# Patient Record
Sex: FEMALE | Race: WHITE | NOT HISPANIC OR LATINO
[De-identification: names, ages, dates, MRNs, and addresses within clinical notes are randomized per-mention and may not be internally consistent; named-entity substitution may affect disease eponyms.]

---

## 2017-03-27 ENCOUNTER — APPOINTMENT (OUTPATIENT)
Dept: SURGERY | Facility: CLINIC | Age: 77
End: 2017-03-27

## 2018-03-21 ENCOUNTER — APPOINTMENT (OUTPATIENT)
Dept: SURGERY | Facility: CLINIC | Age: 78
End: 2018-03-21

## 2018-04-06 ENCOUNTER — APPOINTMENT (OUTPATIENT)
Dept: SURGERY | Facility: CLINIC | Age: 78
End: 2018-04-06
Payer: MEDICARE

## 2018-04-06 VITALS
HEIGHT: 66 IN | OXYGEN SATURATION: 98 % | DIASTOLIC BLOOD PRESSURE: 70 MMHG | SYSTOLIC BLOOD PRESSURE: 120 MMHG | HEART RATE: 68 BPM | BODY MASS INDEX: 23.95 KG/M2 | WEIGHT: 149 LBS

## 2018-04-06 DIAGNOSIS — C73 MALIGNANT NEOPLASM OF THYROID GLAND: ICD-10-CM

## 2018-04-06 PROCEDURE — 99213 OFFICE O/P EST LOW 20 MIN: CPT

## 2018-04-06 RX ORDER — ONDANSETRON 4 MG/1
4 TABLET ORAL
Qty: 90 | Refills: 0 | Status: DISCONTINUED | COMMUNITY
Start: 2017-11-11

## 2018-04-06 RX ORDER — POTASSIUM CITRATE 10 MEQ/1
10 MEQ TABLET, EXTENDED RELEASE ORAL
Qty: 90 | Refills: 0 | Status: DISCONTINUED | COMMUNITY
Start: 2018-01-24

## 2018-04-06 RX ORDER — LATANOPROST/PF 0.005 %
0.01 DROPS OPHTHALMIC (EYE)
Qty: 2 | Refills: 0 | Status: DISCONTINUED | COMMUNITY
Start: 2017-11-23

## 2018-04-06 RX ORDER — PREDNISOLONE ORAL 15 MG/5ML
15 SOLUTION ORAL
Qty: 60 | Refills: 0 | Status: DISCONTINUED | COMMUNITY
Start: 2018-01-11

## 2018-04-06 RX ORDER — OMEPRAZOLE 40 MG/1
40 CAPSULE, DELAYED RELEASE ORAL
Qty: 180 | Refills: 0 | Status: DISCONTINUED | COMMUNITY
Start: 2018-01-05

## 2018-04-06 RX ORDER — FLUTICASONE PROPIONATE 50 UG/1
50 SPRAY, METERED NASAL
Qty: 16 | Refills: 0 | Status: DISCONTINUED | COMMUNITY
Start: 2018-01-22

## 2018-04-06 RX ORDER — BUTALBITAL, ACETAMINOPHEN AND CAFFEINE 325; 50; 40 MG/1; MG/1; MG/1
50-325-40 TABLET ORAL
Qty: 30 | Refills: 0 | Status: DISCONTINUED | COMMUNITY
Start: 2017-12-27

## 2018-04-06 RX ORDER — DICYCLOMINE HYDROCHLORIDE 10 MG/1
10 CAPSULE ORAL
Qty: 240 | Refills: 0 | Status: DISCONTINUED | COMMUNITY
Start: 2017-10-30

## 2018-04-06 RX ORDER — DORZOLAMIDE HYDROCHLORIDE TIMOLOL MALEATE 20; 5 MG/ML; MG/ML
22.3-6.8 SOLUTION/ DROPS OPHTHALMIC
Qty: 10 | Refills: 0 | Status: DISCONTINUED | COMMUNITY
Start: 2017-11-03

## 2018-04-06 RX ORDER — ESCITALOPRAM OXALATE 20 MG/1
20 TABLET ORAL
Qty: 30 | Refills: 0 | Status: DISCONTINUED | COMMUNITY
Start: 2017-11-06

## 2018-04-06 RX ORDER — DICYCLOMINE HYDROCHLORIDE 20 MG/1
20 TABLET ORAL
Qty: 120 | Refills: 0 | Status: DISCONTINUED | COMMUNITY
Start: 2017-11-02

## 2018-04-06 RX ORDER — ROSUVASTATIN CALCIUM 10 MG/1
10 TABLET, FILM COATED ORAL
Qty: 30 | Refills: 0 | Status: DISCONTINUED | COMMUNITY
Start: 2018-01-23

## 2018-04-06 RX ORDER — PANTOPRAZOLE 40 MG/1
40 TABLET, DELAYED RELEASE ORAL
Qty: 30 | Refills: 0 | Status: DISCONTINUED | COMMUNITY
Start: 2017-11-28

## 2018-04-06 RX ORDER — HYDROCORTISONE 25 MG/G
2.5 CREAM TOPICAL
Qty: 57 | Refills: 0 | Status: DISCONTINUED | COMMUNITY
Start: 2018-01-29

## 2018-04-06 RX ORDER — CIPROFLOXACIN HYDROCHLORIDE 500 MG/1
500 TABLET, FILM COATED ORAL
Qty: 14 | Refills: 0 | Status: DISCONTINUED | COMMUNITY
Start: 2017-11-07

## 2020-05-19 ENCOUNTER — INPATIENT (INPATIENT)
Facility: HOSPITAL | Age: 80
LOS: 10 days | Discharge: HOME HEALTH | DRG: 949 | End: 2020-05-30
Attending: PHYSICAL MEDICINE & REHABILITATION | Admitting: PHYSICAL MEDICINE & REHABILITATION
Payer: MEDICARE

## 2020-05-19 VITALS
RESPIRATION RATE: 16 BRPM | SYSTOLIC BLOOD PRESSURE: 151 MMHG | OXYGEN SATURATION: 93 % | TEMPERATURE: 99 F | DIASTOLIC BLOOD PRESSURE: 76 MMHG | HEART RATE: 90 BPM

## 2020-05-19 DIAGNOSIS — Z90.710 ACQUIRED ABSENCE OF BOTH CERVIX AND UTERUS: Chronic | ICD-10-CM

## 2020-05-19 DIAGNOSIS — Z98.890 OTHER SPECIFIED POSTPROCEDURAL STATES: Chronic | ICD-10-CM

## 2020-05-19 DIAGNOSIS — S12.000A UNSPECIFIED DISPLACED FRACTURE OF FIRST CERVICAL VERTEBRA, INITIAL ENCOUNTER FOR CLOSED FRACTURE: ICD-10-CM

## 2020-05-19 DIAGNOSIS — Z95.5 PRESENCE OF CORONARY ANGIOPLASTY IMPLANT AND GRAFT: Chronic | ICD-10-CM

## 2020-05-19 DIAGNOSIS — Z90.49 ACQUIRED ABSENCE OF OTHER SPECIFIED PARTS OF DIGESTIVE TRACT: Chronic | ICD-10-CM

## 2020-05-19 RX ORDER — ENOXAPARIN SODIUM 100 MG/ML
40 INJECTION SUBCUTANEOUS DAILY
Refills: 0 | Status: DISCONTINUED | OUTPATIENT
Start: 2020-05-20 | End: 2020-05-30

## 2020-05-19 RX ORDER — ESCITALOPRAM OXALATE 10 MG/1
20 TABLET, FILM COATED ORAL DAILY
Refills: 0 | Status: DISCONTINUED | OUTPATIENT
Start: 2020-05-19 | End: 2020-05-30

## 2020-05-19 RX ORDER — LATANOPROST 0.05 MG/ML
1 SOLUTION/ DROPS OPHTHALMIC; TOPICAL AT BEDTIME
Refills: 0 | Status: DISCONTINUED | OUTPATIENT
Start: 2020-05-19 | End: 2020-05-30

## 2020-05-19 RX ORDER — ACETAMINOPHEN 500 MG
650 TABLET ORAL EVERY 6 HOURS
Refills: 0 | Status: DISCONTINUED | OUTPATIENT
Start: 2020-05-19 | End: 2020-05-30

## 2020-05-19 RX ORDER — TIMOLOL 0.5 %
1 DROPS OPHTHALMIC (EYE)
Refills: 0 | Status: DISCONTINUED | OUTPATIENT
Start: 2020-05-19 | End: 2020-05-30

## 2020-05-19 RX ORDER — ATORVASTATIN CALCIUM 80 MG/1
40 TABLET, FILM COATED ORAL AT BEDTIME
Refills: 0 | Status: DISCONTINUED | OUTPATIENT
Start: 2020-05-19 | End: 2020-05-30

## 2020-05-19 RX ORDER — GABAPENTIN 400 MG/1
200 CAPSULE ORAL THREE TIMES A DAY
Refills: 0 | Status: DISCONTINUED | OUTPATIENT
Start: 2020-05-19 | End: 2020-05-25

## 2020-05-19 RX ORDER — ASPIRIN/CALCIUM CARB/MAGNESIUM 324 MG
81 TABLET ORAL DAILY
Refills: 0 | Status: DISCONTINUED | OUTPATIENT
Start: 2020-05-19 | End: 2020-05-30

## 2020-05-19 RX ORDER — ONDANSETRON 8 MG/1
4 TABLET, FILM COATED ORAL EVERY 6 HOURS
Refills: 0 | Status: DISCONTINUED | OUTPATIENT
Start: 2020-05-19 | End: 2020-05-30

## 2020-05-19 RX ORDER — LEVOTHYROXINE SODIUM 125 MCG
100 TABLET ORAL DAILY
Refills: 0 | Status: DISCONTINUED | OUTPATIENT
Start: 2020-05-19 | End: 2020-05-30

## 2020-05-19 RX ORDER — PANTOPRAZOLE SODIUM 20 MG/1
40 TABLET, DELAYED RELEASE ORAL
Refills: 0 | Status: DISCONTINUED | OUTPATIENT
Start: 2020-05-19 | End: 2020-05-30

## 2020-05-19 RX ORDER — POTASSIUM CHLORIDE 20 MEQ
10 PACKET (EA) ORAL
Refills: 0 | Status: DISCONTINUED | OUTPATIENT
Start: 2020-05-19 | End: 2020-05-30

## 2020-05-19 RX ORDER — CHOLECALCIFEROL (VITAMIN D3) 125 MCG
1000 CAPSULE ORAL DAILY
Refills: 0 | Status: DISCONTINUED | OUTPATIENT
Start: 2020-05-19 | End: 2020-05-30

## 2020-05-19 RX ORDER — ASCORBIC ACID 60 MG
500 TABLET,CHEWABLE ORAL
Refills: 0 | Status: DISCONTINUED | OUTPATIENT
Start: 2020-05-19 | End: 2020-05-30

## 2020-05-19 RX ORDER — OXYCODONE HYDROCHLORIDE 5 MG/1
5 TABLET ORAL EVERY 4 HOURS
Refills: 0 | Status: DISCONTINUED | OUTPATIENT
Start: 2020-05-19 | End: 2020-05-20

## 2020-05-19 RX ADMIN — OXYCODONE HYDROCHLORIDE 5 MILLIGRAM(S): 5 TABLET ORAL at 17:44

## 2020-05-19 RX ADMIN — Medication 10 MILLIEQUIVALENT(S): at 17:30

## 2020-05-19 RX ADMIN — GABAPENTIN 200 MILLIGRAM(S): 400 CAPSULE ORAL at 21:43

## 2020-05-19 RX ADMIN — ONDANSETRON 4 MILLIGRAM(S): 8 TABLET, FILM COATED ORAL at 17:44

## 2020-05-19 RX ADMIN — ATORVASTATIN CALCIUM 40 MILLIGRAM(S): 80 TABLET, FILM COATED ORAL at 21:43

## 2020-05-19 RX ADMIN — Medication 500 MILLIGRAM(S): at 17:31

## 2020-05-19 RX ADMIN — Medication 20 MILLIGRAM(S): at 17:30

## 2020-05-19 RX ADMIN — LATANOPROST 1 DROP(S): 0.05 SOLUTION/ DROPS OPHTHALMIC; TOPICAL at 21:43

## 2020-05-19 RX ADMIN — Medication 1 DROP(S): at 17:30

## 2020-05-19 NOTE — H&P ADULT - NSHPPHYSICALEXAM_GEN_ALL_CORE
On Physical Examination:  Mental Status - Patient is alert, awake, oriented X3. Speech is fluent, able to  name and repeat. Normal attention and concentration.  Follows commands well and able to answer questions appropriately. Mood and affect  normal.  Cranial Nerves - VFF, PERRL, EOMI, V1-V3 intact, no gross facial asymmetry, no dysarthria, no dysphagia,  Motor Exam -   Right upper  Left upper  Right lower  Left lower   Normal muscle bulk/tone  Sensory    Intact to light touch and pinprick bilaterally. Normal JPS, vibration.Normal cortical sensation  Coord: FTN intact bilaterally   No tremors  Gait -  normal , no ataxia    DTS Reflexes:           Toes are flexor                           GENERAL Exam:     Nontoxic , No Acute Distress 	  HEENT:  normocephalic, atraumatic	  LUNGS:	Clear bilaterally  No Wheeze  Decreased bilaterally	  HEART:	 Normal S1S2   No murmur RRR        GI/ ABDOMEN:  Soft  Non tender  EXTREMITIES:   No Edema  No Clubbing  No Cyanosis No Edema  MUSCULOSKELETAL: Normal Range of Motion	   SKIN:      Normal   No Ecchymosis On Physical Examination:    Vital Signs Last 24 Hrs  T(C): 37 (19 May 2020 15:05), Max: 37 (19 May 2020 15:05)  T(F): 98.6 (19 May 2020 15:05), Max: 98.6 (19 May 2020 15:05)  HR: 90 (19 May 2020 15:05) (90 - 90)  BP: 151/76 (19 May 2020 15:05) (151/76 - 151/76)  RR: 16 (19 May 2020 15:05) (16 - 16)  SpO2: 93% (19 May 2020 15:05) (93% - 93%)    Mental Status - Patient is alert, awake, oriented X3. Speech is fluent, able to  name and repeat. Normal attention and concentration.  Follows commands well and able to answer questions appropriately. Mood and affect  normal.  Cranial Nerves - no gross facial asymmetry, no dysarthria  Motor Exam -   Right upper Full AROM 5/5 throughout   Left upper In sling,   Right lower full AROM, 5/5 throughout   Left lower  full AROM, 5/5 throughout   Normal muscle bulk/tone  Sensory    Intact to light touch bilaterally                  GENERAL Exam:     Nontoxic , No Acute Distress 	  HEENT:  normocephalic, atraumatic	  LUNGS:	Clear bilaterally    HEART:	 Normal S1S2   No murmur RRR        GI/ ABDOMEN:  Soft,  +BS, tender to palpation  EXTREMITIES:   +Edema  left upper extremity   MUSCULOSKELETAL: as above    SKIN:      + Ecchymosis right elbow, partial skin loss with flap intact to right elbow. traumatic partial thickness skin loss over left shin,   No signs of skin breakdown or pressure wounds.

## 2020-05-19 NOTE — H&P ADULT - NSICDXPASTSURGICALHX_GEN_ALL_CORE_FT
PAST SURGICAL HISTORY:  H/O lithotripsy     History of local excision of skin lesion     S/P cholecystectomy     S/P hysterectomy     S/P thyroidectomy     Stented coronary artery

## 2020-05-19 NOTE — H&P ADULT - ASSESSMENT
This is a 81 y/o female with PMHx of CAD s/p stenting, HLD, thyroid CA s/p thyroidectomy, migraines who had a fall down a flight of stairs resulting in a C2 anterior wedge fracture, T9 vertebral fx (age indeterminate), and left humeral fx who now has functional deficits secondary to these injuries consisting of gait and ADL impairments as well as dysphagia.       Functional deficits secondary to trauma/C2 wedge fx, left humeral fx, dysphagia   -Start comprehensive acute IPR program consisting of PT, OT and ST  -    Precautions:  falls, spine                                                                               Diet: soft with     DVT Prophylaxis:                                                                          Medical Prognosis:     Prescreen Comparison: I have reviewed the prescreen information and I found no relevant changes between the preadmission  screening and my post admission evaluation.     Expected Therapy:   P.T.        hrs/day           O. T.      hrs/day           S.L.P.        hrs/day                    P&O                                                   Excpected Frequency: 5 days/7 day period    Rehab Potential:                                           Estimated Disposition:                          ELOS:              days      Rationale For Inpatient Rehab Admission- Patient demonstrates the following:     [X] Medically appropriate for rehabilitation admission   [X] Has attainable rehab goals with an approrpriate discharge plan  [X] Has rehabilitation potential (expected to make significant improvement within a reasonable period of time)  [X] Requires close medical management by a rehab physician, rehab nursing care and comprehensive interdisciplinary team (including         PT, OT, SLP and/or prosthetics and orthotics) This is a 79 y/o female with PMHx of CAD s/p stenting, HLD, thyroid CA s/p thyroidectomy, migraines who had a fall down a flight of stairs resulting in a C2 anterior wedge fracture, T9 vertebral fx (age indeterminate), and left humeral fx who now has functional deficits secondary to these injuries consisting of gait and ADL impairments as well as dysphagia.       Functional deficits secondary to trauma/C2 wedge fx, left humeral fx, dysphagia   -Start comprehensive acute IPR program consisting of PT, OT and ST  -    Precautions:  falls, spine                                                                               Diet: soft with nectar thick liquids     DVT Prophylaxis:   lovenox, IPCs                                                               Medical Prognosis: good    Prescreen Comparison: I have reviewed the prescreen information and I found no relevant changes between the preadmission  screening and my post admission evaluation.     Expected Therapy:   P.T.    2    hrs/day           O. T.     1 hrs/day           S.L.P.  eval     hrs/day           P&O                                                   Expected Frequency: 5 days/7 day period    Rehab Potential:    good          Estimated Disposition:    home with home care         ELOS:        10-14      days      Rationale For Inpatient Rehab Admission- Patient demonstrates the following:     [X] Medically appropriate for rehabilitation admission   [X] Has attainable rehab goals with an appropriate discharge plan  [X] Has rehabilitation potential (expected to make significant improvement within a reasonable period of time)  [X] Requires close medical management by a rehab physician, rehab nursing care and comprehensive interdisciplinary team (including         PT, OT, SLP and/or prosthetics and orthotics) This is a 81 y/o female with PMHx of CAD s/p stenting, HLD, thyroid CA s/p thyroidectomy, migraines who had a fall down a flight of stairs resulting in a C2 anterior wedge fracture, T9 vertebral fx (age indeterminate), and left humeral fx who now has functional deficits secondary to these injuries consisting of gait and ADL impairments as well as dysphagia.       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     Expected Therapy:   P.T.    2    hrs/day           O. T.     1 hrs/day           S.L.P.  eval     hrs/day           P&O                                                   Expected Frequency: 5 days/7 day period    Rehab Potential:    good          Estimated Disposition:    home with home care         ELOS:        10-14      days      Rationale For Inpatient Rehab Admission- Patient demonstrates the following:     [X] Medically appropriate for rehabilitation admission   [X] Has attainable rehab goals with an appropriate discharge plan  [X] Has rehabilitation potential (expected to make significant improvement within a reasonable period of time)  [X] Requires close medical management by a rehab physician, rehab nursing care and comprehensive interdisciplinary team (including         PT, OT, SLP and/or prosthetics and orthotics) This is a 79 y/o female with PMHx of CAD s/p stenting, HLD, thyroid CA s/p thyroidectomy, migraines who had a fall down a flight of stairs resulting in a C2 anterior wedge fracture, T9 vertebral fx (age indeterminate), and left humeral fx who now has functional deficits secondary to these injuries consisting of gait and ADL impairments as well as dysphagia.       Functional deficits secondary to trauma/C2 wedge fx, left humeral fx, dysphagia   -Start comprehensive acute IPR program consisting of PT, OT and ST  -Pain management with prn tylenol and prn oxycodone   -Sling to left arm ATC   -[place] J collar when OOB    Dysphagia  -Patient on [facility] soft with nectar thick liquids at outside hospital  -SLP for swallow eval     Anemia (likely secondary to post op blood loss)  -Monitor H/H     Thrombocytopenia  -Plts in 70s on admission to outside hospital, had been trending up   -Check labs in morning     Migraines   -C/w Fioricet qAM    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     Expected Therapy:   P.T.    2    hrs/day           O. T.     1 hrs/day           S.L.P.  eval     hrs/day           P&O                                                   Expected Frequency: 5 days/7 day period    Rehab Potential:    good          Estimated Disposition:    home with home care         ELOS:        10-14      days      Rationale For Inpatient Rehab Admission- Patient demonstrates the following:     [X] Medically appropriate for rehabilitation admission   [X] Has attainable rehab goals with an appropriate discharge plan  [X] Has rehabilitation potential (expected to make significant improvement within a reasonable period of time)  [X] Requires close medical management by a rehab physician, rehab nursing care and comprehensive interdisciplinary team (including         PT, OT, SLP and/or prosthetics and orthotics)

## 2020-05-19 NOTE — H&P ADULT - NSHPLABSRESULTS_GEN_ALL_CORE
5/18/2020:  Na 137  K 4.2  BUN 16  Cr 0.4  WBC 6.42  H/H 10.1/31.3  Plts 112    SARS-CoV-2 PCR on 5/14/2020 Negative

## 2020-05-19 NOTE — H&P ADULT - NSICDXPASTMEDICALHX_GEN_ALL_CORE_FT
PAST MEDICAL HISTORY:  CAD (coronary artery disease)     HLD (hyperlipidemia)     Kidney stone     Skin cancer     Thyroid cancer PAST MEDICAL HISTORY:  CAD (coronary artery disease)     H/O migraine     HLD (hyperlipidemia)     IBS (irritable bowel syndrome)     Kidney stone     Skin cancer     Thyroid cancer

## 2020-05-19 NOTE — H&P ADULT - NSHPSOCIALHISTORY_GEN_ALL_CORE
Lives with her significant other in a PH, stairs to enter.   Independent with ADLs PTA.   Used quad cane at home for community ambulation.  Patient was working at [employer] prior to accident.   Significant out of work at this time and was helping with house hold duties and     Current functional status:   sit to supine independent   Side lying to sit Mod A   Sit to side laying Min A   sit to stand min A with RW  ambulating 10ft with hemicane and min A with verbal cuing   stand to sit Min A Lives with her significant other in a PH, 4 cement stairs outside. 17 steps to bedroom.    Independent with ADLs PTA.   Has quad cane at home.  Patient was working at [employer] prior to accident.   Significant out of work at this time and was helping with house hold duties and grocery shopping.     Current functional status:   sit to supine independent   Side lying to sit Mod A   Sit to side laying Min A   sit to stand min A with RW  ambulating 10ft with hemicane and min A with verbal cuing   stand to sit Min A

## 2020-05-19 NOTE — H&P ADULT - NSHPREVIEWOFSYSTEMS_GEN_ALL_CORE
Patient denies SOB or cough. + bilat rib pain (posteriorly and anteriorly) with movement or palpation over ribs.  + Left shoulder pain.   H/o migraines, no HAs currently.   + H/o IBS, BM this morning.   Chronic stress incontinence, uses sanitary pads. Denies dysuria.   + chronic pain to abdomen secondary to hysterectomy, patient reports pain is neuropathic in nature and has been on gabapentin for years.   No sensation deficits. Denies tingling or numbness in extremities.  Reports that he she was drinking thin liquids prior to d/c to [facility] but was not upgraded by a ST.

## 2020-05-19 NOTE — H&P ADULT - HISTORY OF PRESENT ILLNESS
This is a 79 y/o female who presented to the ED at [facility] after falling down a flight of stairs. Patient reports that she was getting up to use the restroom and while reaching for a light at the top of the stairs, lost her balance and fell down the stairs. In the ED, she had complaints of right chest wall pain, right elbow pain, and left shoulder pain. CTH was negative, CT of the spine showed corner fx of the anterior aspect of the base of C2. CT chest showed many chronic rib fractures and a T 9 compression fx (indeterminate age). CT of the abdomen showed subcutaneous edema/hemorrhage of the right flank. Xray of the left shoulder showed a comminuted fx of the proximal humerus. Othropedics were consulted and determined no surgical intervention was needed for the left humerus fx was needed. Recommended that patient is to remain NWB to left arm at all times. Neurosurgery was also consulted and determined that no surgical intervention was needed for C2 fx. Recommended that a [place] J collar be worn when OOB only. This is a 81 y/o female who presented to the ED at [facility] after falling down a flight of stairs. Patient reports that she was getting up to use the restroom and while reaching for a light at the top of the stairs, lost her balance and fell down the stairs. In the ED, she had complaints of right chest wall pain, right elbow pain, and left shoulder pain. CTH was negative, CT of the spine showed corner fx of the anterior aspect of the base of C2. CT chest showed many chronic rib fractures and a T 9 compression fx (indeterminate age). CT of the abdomen showed subcutaneous edema/hemorrhage of the right flank. Xray of the left shoulder showed a comminuted fx of the proximal humerus. Othropedics were consulted and determined no surgical intervention was needed for the left humerus fx was needed. Recommended that patient is to remain NWB to left arm at all times. Neurosurgery was also consulted and determined that no surgical intervention was needed for C2 fx. Recommended that a [place] J collar be worn when OOB only.   Swallow eval done on May 15. Found patient to have moderate dysphagia, recommended nectar thick liquids with mechanical soft diet. Recommend small sips/bites. This is a 81 y/o female who presented to the ED at [facility] after falling down a flight of stairs. Patient reports that she was getting up to use the restroom and while reaching for a light at the top of the stairs, lost her balance and fell down the stairs. In the ED, she had complaints of right chest wall pain, right elbow pain, and left shoulder pain. CTH was negative, CT of the spine showed corner fx of the anterior aspect of the base of C2. CT chest showed many chronic rib fractures and a T 9 compression fx (indeterminate age). CT of the abdomen showed subcutaneous edema/hemorrhage of the right flank. Xray of the left shoulder showed a comminuted fx of the proximal humerus. Othropedics were consulted and determined no surgical intervention was needed for the left humerus fx was needed. Recommended that patient is to remain NWB to left arm at all times. Neurosurgery was also consulted and determined that no surgical intervention was needed for C2 fx. Recommended that a [place] J collar be worn when OOB only.   Swallow eval done on May 15. Found patient to have moderate dysphagia, recommended nectar thick liquids with mechanical soft diet. Recommend small sips/bites.     Patient deemed medically stable for transfer to acute IPR at [facility] on 5/19/2020.

## 2020-05-20 LAB
ALBUMIN SERPL ELPH-MCNC: 2.8 G/DL — LOW (ref 3.3–5)
ALP SERPL-CCNC: 132 U/L — HIGH (ref 40–120)
ALT FLD-CCNC: 26 U/L — SIGNIFICANT CHANGE UP (ref 10–45)
ANION GAP SERPL CALC-SCNC: 9 MMOL/L — SIGNIFICANT CHANGE UP (ref 5–17)
AST SERPL-CCNC: 27 U/L — SIGNIFICANT CHANGE UP (ref 10–40)
BASOPHILS # BLD AUTO: 0.03 K/UL — SIGNIFICANT CHANGE UP (ref 0–0.2)
BASOPHILS NFR BLD AUTO: 0.4 % — SIGNIFICANT CHANGE UP (ref 0–2)
BILIRUB SERPL-MCNC: 1 MG/DL — SIGNIFICANT CHANGE UP (ref 0.2–1.2)
BUN SERPL-MCNC: 13 MG/DL — SIGNIFICANT CHANGE UP (ref 7–23)
CALCIUM SERPL-MCNC: 8.6 MG/DL — SIGNIFICANT CHANGE UP (ref 8.4–10.5)
CHLORIDE SERPL-SCNC: 101 MMOL/L — SIGNIFICANT CHANGE UP (ref 96–108)
CO2 SERPL-SCNC: 30 MMOL/L — SIGNIFICANT CHANGE UP (ref 22–31)
CREAT SERPL-MCNC: 0.44 MG/DL — LOW (ref 0.5–1.3)
EOSINOPHIL # BLD AUTO: 0.16 K/UL — SIGNIFICANT CHANGE UP (ref 0–0.5)
EOSINOPHIL NFR BLD AUTO: 2.3 % — SIGNIFICANT CHANGE UP (ref 0–6)
GLUCOSE SERPL-MCNC: 109 MG/DL — HIGH (ref 70–99)
HCT VFR BLD CALC: 31.8 % — LOW (ref 34.5–45)
HGB BLD-MCNC: 10.3 G/DL — LOW (ref 11.5–15.5)
IMM GRANULOCYTES NFR BLD AUTO: 1.4 % — SIGNIFICANT CHANGE UP (ref 0–1.5)
LYMPHOCYTES # BLD AUTO: 1.65 K/UL — SIGNIFICANT CHANGE UP (ref 1–3.3)
LYMPHOCYTES # BLD AUTO: 23.2 % — SIGNIFICANT CHANGE UP (ref 13–44)
MAGNESIUM SERPL-MCNC: 1.8 MG/DL — SIGNIFICANT CHANGE UP (ref 1.6–2.6)
MCHC RBC-ENTMCNC: 29.9 PG — SIGNIFICANT CHANGE UP (ref 27–34)
MCHC RBC-ENTMCNC: 32.4 GM/DL — SIGNIFICANT CHANGE UP (ref 32–36)
MCV RBC AUTO: 92.4 FL — SIGNIFICANT CHANGE UP (ref 80–100)
MONOCYTES # BLD AUTO: 0.67 K/UL — SIGNIFICANT CHANGE UP (ref 0–0.9)
MONOCYTES NFR BLD AUTO: 9.4 % — SIGNIFICANT CHANGE UP (ref 2–14)
NEUTROPHILS # BLD AUTO: 4.5 K/UL — SIGNIFICANT CHANGE UP (ref 1.8–7.4)
NEUTROPHILS NFR BLD AUTO: 63.3 % — SIGNIFICANT CHANGE UP (ref 43–77)
NRBC # BLD: 0 /100 WBCS — SIGNIFICANT CHANGE UP (ref 0–0)
PHOSPHATE SERPL-MCNC: 3.4 MG/DL — SIGNIFICANT CHANGE UP (ref 2.5–4.5)
PLATELET # BLD AUTO: 137 K/UL — LOW (ref 150–400)
POTASSIUM SERPL-MCNC: 3.8 MMOL/L — SIGNIFICANT CHANGE UP (ref 3.5–5.3)
POTASSIUM SERPL-SCNC: 3.8 MMOL/L — SIGNIFICANT CHANGE UP (ref 3.5–5.3)
PROT SERPL-MCNC: 6.2 G/DL — SIGNIFICANT CHANGE UP (ref 6–8.3)
RBC # BLD: 3.44 M/UL — LOW (ref 3.8–5.2)
RBC # FLD: 13.3 % — SIGNIFICANT CHANGE UP (ref 10.3–14.5)
SARS-COV-2 RNA SPEC QL NAA+PROBE: SIGNIFICANT CHANGE UP
SODIUM SERPL-SCNC: 140 MMOL/L — SIGNIFICANT CHANGE UP (ref 135–145)
VIT D25+D1,25 OH+D1,25 PNL SERPL-MCNC: 94 PG/ML — HIGH (ref 19.9–79.3)
WBC # BLD: 7.11 K/UL — SIGNIFICANT CHANGE UP (ref 3.8–10.5)
WBC # FLD AUTO: 7.11 K/UL — SIGNIFICANT CHANGE UP (ref 3.8–10.5)

## 2020-05-20 PROCEDURE — 71045 X-RAY EXAM CHEST 1 VIEW: CPT | Mod: 26

## 2020-05-20 PROCEDURE — 99223 1ST HOSP IP/OBS HIGH 75: CPT

## 2020-05-20 RX ORDER — LOPERAMIDE HCL 2 MG
2 TABLET ORAL EVERY 6 HOURS
Refills: 0 | Status: DISCONTINUED | OUTPATIENT
Start: 2020-05-20 | End: 2020-05-30

## 2020-05-20 RX ORDER — LANOLIN ALCOHOL/MO/W.PET/CERES
6 CREAM (GRAM) TOPICAL AT BEDTIME
Refills: 0 | Status: DISCONTINUED | OUTPATIENT
Start: 2020-05-20 | End: 2020-05-23

## 2020-05-20 RX ORDER — TRAMADOL HYDROCHLORIDE 50 MG/1
50 TABLET ORAL EVERY 4 HOURS
Refills: 0 | Status: DISCONTINUED | OUTPATIENT
Start: 2020-05-20 | End: 2020-05-26

## 2020-05-20 RX ADMIN — Medication 10 MILLIEQUIVALENT(S): at 17:31

## 2020-05-20 RX ADMIN — TRAMADOL HYDROCHLORIDE 50 MILLIGRAM(S): 50 TABLET ORAL at 20:46

## 2020-05-20 RX ADMIN — PANTOPRAZOLE SODIUM 40 MILLIGRAM(S): 20 TABLET, DELAYED RELEASE ORAL at 05:08

## 2020-05-20 RX ADMIN — Medication 1 DROP(S): at 05:06

## 2020-05-20 RX ADMIN — Medication 20 MILLIGRAM(S): at 11:21

## 2020-05-20 RX ADMIN — GABAPENTIN 200 MILLIGRAM(S): 400 CAPSULE ORAL at 14:15

## 2020-05-20 RX ADMIN — Medication 500 MILLIGRAM(S): at 17:31

## 2020-05-20 RX ADMIN — Medication 81 MILLIGRAM(S): at 11:21

## 2020-05-20 RX ADMIN — GABAPENTIN 200 MILLIGRAM(S): 400 CAPSULE ORAL at 05:06

## 2020-05-20 RX ADMIN — ONDANSETRON 4 MILLIGRAM(S): 8 TABLET, FILM COATED ORAL at 11:21

## 2020-05-20 RX ADMIN — Medication 20 MILLIGRAM(S): at 17:31

## 2020-05-20 RX ADMIN — Medication 10 MILLIEQUIVALENT(S): at 05:06

## 2020-05-20 RX ADMIN — Medication 2 MILLIGRAM(S): at 17:33

## 2020-05-20 RX ADMIN — ESCITALOPRAM OXALATE 20 MILLIGRAM(S): 10 TABLET, FILM COATED ORAL at 11:21

## 2020-05-20 RX ADMIN — Medication 1 TABLET(S): at 11:21

## 2020-05-20 RX ADMIN — ENOXAPARIN SODIUM 40 MILLIGRAM(S): 100 INJECTION SUBCUTANEOUS at 11:23

## 2020-05-20 RX ADMIN — Medication 20 MILLIGRAM(S): at 05:08

## 2020-05-20 RX ADMIN — ATORVASTATIN CALCIUM 40 MILLIGRAM(S): 80 TABLET, FILM COATED ORAL at 20:46

## 2020-05-20 RX ADMIN — Medication 6 MILLIGRAM(S): at 20:46

## 2020-05-20 RX ADMIN — ONDANSETRON 4 MILLIGRAM(S): 8 TABLET, FILM COATED ORAL at 01:03

## 2020-05-20 RX ADMIN — OXYCODONE HYDROCHLORIDE 5 MILLIGRAM(S): 5 TABLET ORAL at 11:21

## 2020-05-20 RX ADMIN — Medication 1 DROP(S): at 17:31

## 2020-05-20 RX ADMIN — Medication 2 MILLIGRAM(S): at 10:33

## 2020-05-20 RX ADMIN — LATANOPROST 1 DROP(S): 0.05 SOLUTION/ DROPS OPHTHALMIC; TOPICAL at 20:46

## 2020-05-20 RX ADMIN — Medication 100 MICROGRAM(S): at 05:05

## 2020-05-20 RX ADMIN — Medication 1000 UNIT(S): at 11:21

## 2020-05-20 RX ADMIN — Medication 1 TABLET(S): at 08:19

## 2020-05-20 RX ADMIN — GABAPENTIN 200 MILLIGRAM(S): 400 CAPSULE ORAL at 20:46

## 2020-05-20 RX ADMIN — Medication 500 MILLIGRAM(S): at 05:05

## 2020-05-20 RX ADMIN — ONDANSETRON 4 MILLIGRAM(S): 8 TABLET, FILM COATED ORAL at 17:31

## 2020-05-20 NOTE — CONSULT NOTE ADULT - SUBJECTIVE AND OBJECTIVE BOX
HPI:  81yo female with hx of Hypothyroidism, glaucoma, HLD, presented to [facility] for acute rehab from [facility] after falling down a flight of stairs. Patient reports that she was getting up to use the restroom and while reaching for a light at the top of the stairs, lost her balance and fell down the stairs. CTH was negative, CT of the spine showed corner fx of the anterior aspect of the base of C2. CT chest showed many chronic rib fractures and a T 9 compression fx (indeterminate age). CT of the abdomen showed subcutaneous edema/hemorrhage of the right flank. Xray of the left shoulder showed a comminuted fx of the proximal humerus. Orthopedic surgery recommended no surgical intervention for the left humerus fx. Recommended NWB to left arm at all times. Neurosurgery recommended no surgical intervention for C2 fx. Recommended that a [place] J collar be worn when OOB only.   Swallow eval done on May 15. Found patient to have moderate dysphagia, recommended nectar thick liquids with mechanical soft diet. Recommend small sips/bites.   Pt was considered stable for discharge to acute rehab      Home Medications:  Synthroid 100mcg daily  Atorvastatin 40mg daily  Timolol 0.25% soln both eyes BID  Latanoprost 0.005% soln both eyes QHS     PAST MEDICAL & SURGICAL HISTORY:  IBS (irritable bowel syndrome)  H/O migraine  Skin cancer  Kidney stone  Thyroid cancer  HLD (hyperlipidemia)  CAD (coronary artery disease)  S/P cholecystectomy  S/P hysterectomy  History of local excision of skin lesion  H/O lithotripsy  S/P thyroidectomy  Stented coronary artery      Review of Systems:   CONSTITUTIONAL: No fever, weight loss, or fatigue  EYES: No eye pain, visual disturbances, or discharge  ENMT:  No difficulty hearing, tinnitus, vertigo; No sinus or throat pain  NECK: No pain or stiffness  BREASTS: No pain, masses, or nipple discharge  RESPIRATORY: No cough, wheezing, chills or hemoptysis; No shortness of breath  CARDIOVASCULAR: No chest pain, palpitations, dizziness, or leg swelling  GASTROINTESTINAL: No abdominal or epigastric pain. No nausea, vomiting, or hematemesis; No diarrhea or constipation. No melena or hematochezia.  GENITOURINARY: No dysuria, frequency, hematuria, or incontinence  NEUROLOGICAL: No headaches, memory loss, loss of strength, numbness, or tremors  SKIN: No itching, burning, rashes, or lesions   LYMPH NODES: No enlarged glands  ENDOCRINE: No heat or cold intolerance; No hair loss  MUSCULOSKELETAL: No joint pain or swelling; No muscle, back, or extremity pain  PSYCHIATRIC: No depression, anxiety, mood swings, or difficulty sleeping  HEME/LYMPH: No easy bruising, or bleeding gums  ALLERY AND IMMUNOLOGIC: No hives or eczema    Allergies  Valium (Unknown)  Intolerances    Social History:   Lives at home with significant other  Denies smoking, EtOH use    FAMILY HISTORY:  Noncontributory    MEDICATIONS  (STANDING):  acetaminophen 325 mG/butalbital 50 mG/caffeine 40 mG 1 Tablet(s) Oral <User Schedule>  ascorbic acid 500 milliGRAM(s) Oral two times a day  aspirin enteric coated 81 milliGRAM(s) Oral daily  atorvastatin 40 milliGRAM(s) Oral at bedtime  cholecalciferol 1000 Unit(s) Oral daily  dicyclomine 20 milliGRAM(s) Oral three times a day before meals  enoxaparin Injectable 40 milliGRAM(s) SubCutaneous daily  escitalopram 20 milliGRAM(s) Oral daily  gabapentin 200 milliGRAM(s) Oral three times a day  latanoprost 0.005% Ophthalmic Solution 1 Drop(s) Both EYES at bedtime  levothyroxine 100 MICROGram(s) Oral daily  multivitamin 1 Tablet(s) Oral daily  pantoprazole    Tablet 40 milliGRAM(s) Oral before breakfast  potassium chloride    Tablet ER 10 milliEquivalent(s) Oral two times a day  rifAXIMin 550 milliGRAM(s) Oral <User Schedule>  timolol 0.25% Solution 1 Drop(s) Both EYES two times a day    MEDICATIONS  (PRN):  acetaminophen   Tablet .. 650 milliGRAM(s) Oral every 6 hours PRN Mild Pain (1 - 3)  loperamide 2 milliGRAM(s) Oral every 6 hours PRN abdominal cramping from IBS  ondansetron   Disintegrating Tablet 4 milliGRAM(s) Oral every 6 hours PRN Nausea and/or Vomiting  oxyCODONE    IR 5 milliGRAM(s) Oral every 4 hours PRN Moderate Pain (4 - 6)      Vital Signs Last 24 Hrs  T(C): 36.7 (20 May 2020 08:05), Max: 37 (19 May 2020 15:05)  T(F): 98 (20 May 2020 08:05), Max: 98.6 (19 May 2020 15:05)  HR: 85 (20 May 2020 08:05) (85 - 90)  BP: 141/84 (20 May 2020 08:05) (141/84 - 151/76)  BP(mean): --  RR: 14 (20 May 2020 08:05) (14 - 16)  SpO2: 95% (20 May 2020 08:05) (93% - 95%)  CAPILLARY BLOOD GLUCOSE            PHYSICAL EXAM:  GENERAL: NAD, Elderly female  HEAD:  Atraumatic, Normocephalic  EYES: EOMI, PERRLA, conjunctiva and sclera clear  NECK: C-collar,  No JVD  CHEST/LUNG: Clear to auscultation bilaterally; No wheeze  HEART: Regular rate and rhythm; No murmurs, rubs, or gallops  ABDOMEN: Soft, Nontender, Nondistended; Bowel sounds present  EXTREMITIES:  2+ Peripheral Pulses, No clubbing, cyanosis, or edema. LUE in sling   MSK: B/L UE Limited ROM due to pain  PSYCH: AAOx3  NEUROLOGY: non-focal  SKIN: No rashes or lesions    LABS:                        10.3   7.11  )-----------( 137      ( 20 May 2020 05:10 )             31.8     05-20    140  |  101  |  13  ----------------------------<  109<H>  3.8   |  30  |  0.44<L>    Ca    8.6      20 May 2020 05:10  Phos  3.4     05-20  Mg     1.8     05-20    TPro  6.2  /  Alb  2.8<L>  /  TBili  1.0  /  DBili  x   /  AST  27  /  ALT  26  /  AlkPhos  132<H>  05-20      RADIOLOGY & ADDITIONAL TESTS:    Imaging Personally Reviewed:    Consultant(s) Notes Reviewed:      Care Discussed with Consultants/Other Providers:

## 2020-05-20 NOTE — CHART NOTE - NSCHARTNOTEFT_GEN_A_CORE
This afternoon the patient reported that she had not slept well the last three nights and last night she did not fall asleep at all. Patient reports that last night she had a hallucination and was aware that the person she saw in her room was not actually present. She attributes this to the oxycodone she took late yesterday afternoon and poor sleep. She is requesting that the oxycodone be stopped.   Would like something for sleep.   Oxycodone discontinued and tramadol 50mg q4hr prn started.   Melatonin 6mg qHS started.   Vitals have remained stable. Patient has no s/s of infection. Patient remains oriented x 4. Will continue to monitor. Labs due for tomorrow morning.

## 2020-05-20 NOTE — CONSULT NOTE ADULT - ASSESSMENT
81yo female with hx of Hypothyroidism, glaucoma, HLD, presented to [facility] for acute rehab from [facility] after falling down a flight of stairs, noted to have C2 Fracture, Chronic rib fracture, T9 compression fracture (age indeterminate, left humeral fx.    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

## 2020-05-20 NOTE — PROGRESS NOTE ADULT - ASSESSMENT
This is a 81 y/o female with PMHx of CAD s/p stenting, HLD, thyroid CA s/p thyroidectomy, migraines who had a fall down a flight of stairs resulting in a C2 anterior wedge fracture, T9 vertebral fx (age indeterminate), and left humeral fx who now has functional deficits secondary to these injuries consisting of gait and ADL impairments as well as dysphagia.     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

## 2020-05-20 NOTE — DIETITIAN INITIAL EVALUATION ADULT. - ADD RECOMMEND
1) Monitor Weights, Intake, Tolerance, Skin & Labwork   2) Education Provided on Need for Increased Fluids 3) Recommend Change Diet to Low Sodium Diet 4) Continue Nutrition Plan of Care  (Declines Nutrition Supplementation)

## 2020-05-20 NOTE — PROGRESS NOTE ADULT - ASSESSMENT
This is a 79 y/o female with PMHx of CAD s/p stenting, HLD, thyroid CA s/p thyroidectomy, migraines who had a fall down a flight of stairs resulting in a C2 anterior wedge fracture, T9 vertebral fx (age indeterminate), and left humeral fx who now has functional deficits secondary to these injuries consisting of gait and ADL impairments as well as dysphagia.     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

## 2020-05-20 NOTE — PROGRESS NOTE ADULT - SUBJECTIVE AND OBJECTIVE BOX
Hiram Johnson M.D. Pager Number [phone]    Patient is a 80y old  Female who presents with a chief complaint of mutitrauma with c2 anterior wedge fx, t9 fx and left humeral fx (20 May 2020 09:55)      SUBJECTIVE / OVERNIGHT EVENTS:  Pt seen and examined at bedside. No acute events overnight.  Pt denies cp, palpitations, sob, abd pain, N/V, fever, chills.    ROS:  All other review of systems negative    Allergies    Valium (Unknown)    Intolerances        MEDICATIONS  (STANDING):  acetaminophen 325 mG/butalbital 50 mG/caffeine 40 mG 1 Tablet(s) Oral <User Schedule>  ascorbic acid 500 milliGRAM(s) Oral two times a day  aspirin enteric coated 81 milliGRAM(s) Oral daily  atorvastatin 40 milliGRAM(s) Oral at bedtime  cholecalciferol 1000 Unit(s) Oral daily  dicyclomine 20 milliGRAM(s) Oral three times a day before meals  enoxaparin Injectable 40 milliGRAM(s) SubCutaneous daily  escitalopram 20 milliGRAM(s) Oral daily  gabapentin 200 milliGRAM(s) Oral three times a day  latanoprost 0.005% Ophthalmic Solution 1 Drop(s) Both EYES at bedtime  levothyroxine 100 MICROGram(s) Oral daily  multivitamin 1 Tablet(s) Oral daily  pantoprazole    Tablet 40 milliGRAM(s) Oral before breakfast  potassium chloride    Tablet ER 10 milliEquivalent(s) Oral two times a day  rifAXIMin 550 milliGRAM(s) Oral <User Schedule>  timolol 0.25% Solution 1 Drop(s) Both EYES two times a day    MEDICATIONS  (PRN):  acetaminophen   Tablet .. 650 milliGRAM(s) Oral every 6 hours PRN Mild Pain (1 - 3)  loperamide 2 milliGRAM(s) Oral every 6 hours PRN abdominal cramping from IBS  ondansetron   Disintegrating Tablet 4 milliGRAM(s) Oral every 6 hours PRN Nausea and/or Vomiting  oxyCODONE    IR 5 milliGRAM(s) Oral every 4 hours PRN Moderate Pain (4 - 6)      Vital Signs Last 24 Hrs  T(C): 36.7 (20 May 2020 08:05), Max: 37 (19 May 2020 15:05)  T(F): 98 (20 May 2020 08:05), Max: 98.6 (19 May 2020 15:05)  HR: 85 (20 May 2020 08:05) (85 - 90)  BP: 141/84 (20 May 2020 08:05) (141/84 - 151/76)  BP(mean): --  RR: 14 (20 May 2020 08:05) (14 - 16)  SpO2: 95% (20 May 2020 08:05) (93% - 95%)  CAPILLARY BLOOD GLUCOSE        I&O's Summary    20 May 2020 07:01  -  20 May 2020 10:49  --------------------------------------------------------  IN: 240 mL / OUT: 0 mL / NET: 240 mL        PHYSICAL EXAM:  GENERAL: NAD, Elderly female   HEAD:  Atraumatic, Normocephalic  EYES: EOMI, PERRLA, conjunctiva and sclera clear  NECK: C-Collar, No JVD  CHEST/LUNG: Clear to auscultation bilaterally; No wheeze  HEART: Regular rate and rhythm; No murmurs, rubs, or gallops  ABDOMEN: Soft, Nontender, Nondistended; Bowel sounds present  EXTREMITIES:  2+ Peripheral Pulses, No clubbing, cyanosis, or edema  NEUROLOGY: AAOx3, non-focal  PSYCH: calm  SKIN: No rashes or lesions    LABS:                        10.3   7.11  )-----------( 137      ( 20 May 2020 05:10 )             31.8     05-20    140  |  101  |  13  ----------------------------<  109<H>  3.8   |  30  |  0.44<L>    Ca    8.6      20 May 2020 05:10  Phos  3.4     05-20  Mg     1.8     05-20    TPro  6.2  /  Alb  2.8<L>  /  TBili  1.0  /  DBili  x   /  AST  27  /  ALT  26  /  AlkPhos  132<H>  05-20              RADIOLOGY & ADDITIONAL TESTS:  Results Reviewed:   Imaging Personally Reviewed:  Electrocardiogram Personally Reviewed:    COORDINATION OF CARE:  Care Discussed with Consultants/Other Providers [Y/N]:  Prior or Outpatient Records Reviewed [Y/N]:

## 2020-05-20 NOTE — DIETITIAN INITIAL EVALUATION ADULT. - DIET TYPE
on DASH-TLC Diet w/ Thin Liquids - Diet Consistency Upgraded by Speech Therapy Today  Declines Nutrition Supplementation   Recommend Change Diet to  Low Sodium Diet   Patient Does Not Follow Diet @Home (But Tries to Eat Healthy), Consumes 2Meals a Day  & Does Take Vitamin D & Probiotic Supplements @Home low sodium/supplement (specify)/Recommend Change Diet to Low Sodium Diet & Declines Nutrition Supplementation

## 2020-05-20 NOTE — CHART NOTE - NSCHARTNOTEFT_GEN_A_CORE
Upon Nutritional Assessment by the Registered Dietitian Your Patient was Determined to Meet criteria/has Evidence of the Following Diagnosis:          [X]  Acute Moderate Protein-Calorie Malnutrition    Findings as based on:  [X] Comprehensive Nutrition Assessment   [X] Nutrition Focused Physical Exam - Temporalis, Orbital Fat Pads, Buccal Fat Pads & Gastrocnemius(Calf)  Wasting/Depletion Observed  [X] Other: Poor PO Intake 5+ Days & Significant Weight Decrease Over Last Month    Nutrition Plan/Recommendations:    1) Declines Nutrition Supplementation   2) Recommend Change Diet to Low Sodium Diet     PROVIDER Section:   By Signing This Assessment You Are Acknowledging & Agree with The Diagnosis Assigned by the Registered Dietitian    [place] RDN

## 2020-05-20 NOTE — PROGRESS NOTE ADULT - SUBJECTIVE AND OBJECTIVE BOX
HPI:  This is a 81 y/o female who presented to the ED at [facility] after falling down a flight of stairs. Patient reports that she was getting up to use the restroom and while reaching for a light at the top of the stairs, lost her balance and fell down the stairs. In the ED, she had complaints of right chest wall pain, right elbow pain, and left shoulder pain. CTH was negative, CT of the spine showed corner fx of the anterior aspect of the base of C2. CT chest showed many chronic rib fractures and a T 9 compression fx (indeterminate age). CT of the abdomen showed subcutaneous edema/hemorrhage of the right flank. Xray of the left shoulder showed a comminuted fx of the proximal humerus. Othropedics were consulted and determined no surgical intervention was needed for the left humerus fx was needed. Recommended that patient is to remain NWB to left arm at all times. Neurosurgery was also consulted and determined that no surgical intervention was needed for C2 fx. Recommended that a [place] J collar be worn when OOB only.   Swallow eval done on May 15. Found patient to have moderate dysphagia, recommended nectar thick liquids with mechanical soft diet. Recommend small sips/bites.     Patient deemed medically stable for transfer to acute IPR at [facility] on 5/19/2020. (19 May 2020 14:57)    ROS- CO diffuse pain Abd, ribs L shoulder  CO cramping Abdomen- Uses Immodium at home for relief    Vital Signs Last 24 Hrs  T(C): 36.7 (20 May 2020 08:05), Max: 37 (19 May 2020 15:05)  T(F): 98 (20 May 2020 08:05), Max: 98.6 (19 May 2020 15:05)  HR: 85 (20 May 2020 08:05) (85 - 90)  BP: 141/84 (20 May 2020 08:05) (141/84 - 151/76)  BP(mean): --  RR: 14 (20 May 2020 08:05) (14 - 16)  SpO2: 95% (20 May 2020 08:05) (93% - 95%) on 2l    Physical Exam  GENERAL Exam:     Nontoxic , No Acute Distress 	On [place] O2 with [place] J on  	HEENT:  normocephalic, atraumatic	  	LUNGS:	Clear bilaterally    	HEART:	 Normal S1S2   No murmur RRR        	GI/ ABDOMEN:  Soft,  +BS, tender to palpation  	EXTREMITIES:   +Edema  left upper extremity in sling  	MUSCULOSKELETAL: as above    	SKIN:      + Ecchymosis right elbow, partial skin loss with flap intact to right elbow. traumatic partial thickness skin loss over left shin,   No signs of skin breakdown or pressure wounds  RUE/BLES with FROM MS 5/5  LUE with Good ROM elbow>distally with MS 5/5 L shoulder not Tested  Sensation Intact to lt Touch throughout    Rehab Eval In progress                          10.3   7.11  )-----------( 137      ( 20 May 2020 05:10 )             31.8   05-20    140  |  101  |  13  ----------------------------<  109<H>  3.8   |  30  |  0.44<L>    Ca    8.6      20 May 2020 05:10  Phos  3.4     05-20  Mg     1.8     05-20    TPro  6.2  /  Alb  2.8<L>  /  TBili  1.0  /  DBili  x   /  AST  27  /  ALT  26  /  AlkPhos  132<H>  05-20

## 2020-05-20 NOTE — DIETITIAN INITIAL EVALUATION ADULT. - ENERGY NEEDS
Height: 66Inches   Weight: 150lb   Body Mass Index (BMI): 24.3kg/m2   Ideal Body Weight Range: 130lb (+/-10%)   Percent Ideal Body Weight ~115%

## 2020-05-20 NOTE — DIETITIAN INITIAL EVALUATION ADULT. - OTHER INFO
80yr Old Female - Dx: Cervial Fx - Initial Assessment - Diet - DASH-TLC Diet w/ Thin Liquids (Declines Nutrition Supplementation & Recommend Change Diet to Low Sodium Diet), Denies Food Allergy/Intolerance, Tolerates Diet Well, No Chewing/Swallowing Complications (Per Patient), No Pressure Ulcers (as Per Nursing Flow Sheets), No Edema Noted (as Per Nursing Flow Sheets), No Recent Nausea/Vomiting/Constipation & Some Diarrhea(as Per Patient)

## 2020-05-21 LAB
ANION GAP SERPL CALC-SCNC: 8 MMOL/L — SIGNIFICANT CHANGE UP (ref 5–17)
BUN SERPL-MCNC: 14 MG/DL — SIGNIFICANT CHANGE UP (ref 7–23)
CALCIUM SERPL-MCNC: 9 MG/DL — SIGNIFICANT CHANGE UP (ref 8.4–10.5)
CHLORIDE SERPL-SCNC: 103 MMOL/L — SIGNIFICANT CHANGE UP (ref 96–108)
CO2 SERPL-SCNC: 29 MMOL/L — SIGNIFICANT CHANGE UP (ref 22–31)
CREAT SERPL-MCNC: 0.53 MG/DL — SIGNIFICANT CHANGE UP (ref 0.5–1.3)
GLUCOSE SERPL-MCNC: 119 MG/DL — HIGH (ref 70–99)
HCT VFR BLD CALC: 34.1 % — LOW (ref 34.5–45)
HGB BLD-MCNC: 11.5 G/DL — SIGNIFICANT CHANGE UP (ref 11.5–15.5)
MCHC RBC-ENTMCNC: 31.5 PG — SIGNIFICANT CHANGE UP (ref 27–34)
MCHC RBC-ENTMCNC: 33.7 GM/DL — SIGNIFICANT CHANGE UP (ref 32–36)
MCV RBC AUTO: 93.4 FL — SIGNIFICANT CHANGE UP (ref 80–100)
NRBC # BLD: 0 /100 WBCS — SIGNIFICANT CHANGE UP (ref 0–0)
PLATELET # BLD AUTO: 199 K/UL — SIGNIFICANT CHANGE UP (ref 150–400)
POTASSIUM SERPL-MCNC: 4.2 MMOL/L — SIGNIFICANT CHANGE UP (ref 3.5–5.3)
POTASSIUM SERPL-SCNC: 4.2 MMOL/L — SIGNIFICANT CHANGE UP (ref 3.5–5.3)
RBC # BLD: 3.65 M/UL — LOW (ref 3.8–5.2)
RBC # FLD: 13.9 % — SIGNIFICANT CHANGE UP (ref 10.3–14.5)
SODIUM SERPL-SCNC: 140 MMOL/L — SIGNIFICANT CHANGE UP (ref 135–145)
WBC # BLD: 8.61 K/UL — SIGNIFICANT CHANGE UP (ref 3.8–10.5)
WBC # FLD AUTO: 8.61 K/UL — SIGNIFICANT CHANGE UP (ref 3.8–10.5)

## 2020-05-21 PROCEDURE — 99232 SBSQ HOSP IP/OBS MODERATE 35: CPT

## 2020-05-21 RX ORDER — LIDOCAINE 4 G/100G
1 CREAM TOPICAL ONCE
Refills: 0 | Status: COMPLETED | OUTPATIENT
Start: 2020-05-21 | End: 2020-05-21

## 2020-05-21 RX ORDER — LIDOCAINE 4 G/100G
1 CREAM TOPICAL
Refills: 0 | Status: DISCONTINUED | OUTPATIENT
Start: 2020-05-22 | End: 2020-05-25

## 2020-05-21 RX ADMIN — Medication 20 MILLIGRAM(S): at 05:36

## 2020-05-21 RX ADMIN — TRAMADOL HYDROCHLORIDE 50 MILLIGRAM(S): 50 TABLET ORAL at 11:32

## 2020-05-21 RX ADMIN — ATORVASTATIN CALCIUM 40 MILLIGRAM(S): 80 TABLET, FILM COATED ORAL at 21:01

## 2020-05-21 RX ADMIN — ESCITALOPRAM OXALATE 20 MILLIGRAM(S): 10 TABLET, FILM COATED ORAL at 11:32

## 2020-05-21 RX ADMIN — LIDOCAINE 1 PATCH: 4 CREAM TOPICAL at 20:59

## 2020-05-21 RX ADMIN — PANTOPRAZOLE SODIUM 40 MILLIGRAM(S): 20 TABLET, DELAYED RELEASE ORAL at 05:37

## 2020-05-21 RX ADMIN — Medication 1 TABLET(S): at 08:55

## 2020-05-21 RX ADMIN — ONDANSETRON 4 MILLIGRAM(S): 8 TABLET, FILM COATED ORAL at 08:31

## 2020-05-21 RX ADMIN — GABAPENTIN 200 MILLIGRAM(S): 400 CAPSULE ORAL at 05:36

## 2020-05-21 RX ADMIN — ENOXAPARIN SODIUM 40 MILLIGRAM(S): 100 INJECTION SUBCUTANEOUS at 11:32

## 2020-05-21 RX ADMIN — Medication 10 MILLIEQUIVALENT(S): at 05:36

## 2020-05-21 RX ADMIN — Medication 6 MILLIGRAM(S): at 21:01

## 2020-05-21 RX ADMIN — Medication 81 MILLIGRAM(S): at 11:32

## 2020-05-21 RX ADMIN — Medication 20 MILLIGRAM(S): at 11:32

## 2020-05-21 RX ADMIN — Medication 1000 UNIT(S): at 11:32

## 2020-05-21 RX ADMIN — Medication 1 DROP(S): at 05:37

## 2020-05-21 RX ADMIN — Medication 500 MILLIGRAM(S): at 05:36

## 2020-05-21 RX ADMIN — Medication 10 MILLIEQUIVALENT(S): at 17:19

## 2020-05-21 RX ADMIN — Medication 20 MILLIGRAM(S): at 17:19

## 2020-05-21 RX ADMIN — GABAPENTIN 200 MILLIGRAM(S): 400 CAPSULE ORAL at 12:32

## 2020-05-21 RX ADMIN — TRAMADOL HYDROCHLORIDE 50 MILLIGRAM(S): 50 TABLET ORAL at 21:05

## 2020-05-21 RX ADMIN — LIDOCAINE 1 PATCH: 4 CREAM TOPICAL at 23:37

## 2020-05-21 RX ADMIN — LIDOCAINE 1 PATCH: 4 CREAM TOPICAL at 10:22

## 2020-05-21 RX ADMIN — Medication 100 MICROGRAM(S): at 05:36

## 2020-05-21 RX ADMIN — Medication 1 DROP(S): at 17:21

## 2020-05-21 RX ADMIN — LATANOPROST 1 DROP(S): 0.05 SOLUTION/ DROPS OPHTHALMIC; TOPICAL at 21:01

## 2020-05-21 RX ADMIN — Medication 500 MILLIGRAM(S): at 17:19

## 2020-05-21 RX ADMIN — Medication 1 TABLET(S): at 11:32

## 2020-05-21 RX ADMIN — GABAPENTIN 200 MILLIGRAM(S): 400 CAPSULE ORAL at 21:01

## 2020-05-21 NOTE — PROGRESS NOTE ADULT - SUBJECTIVE AND OBJECTIVE BOX
HPI:  This is a 79 y/o female who presented to the ED at [facility] after falling down a flight of stairs. Patient reports that she was getting up to use the restroom and while reaching for a light at the top of the stairs, lost her balance and fell down the stairs. In the ED, she had complaints of right chest wall pain, right elbow pain, and left shoulder pain. CTH was negative, CT of the spine showed corner fx of the anterior aspect of the base of C2. CT chest showed many chronic rib fractures and a T 9 compression fx (indeterminate age). CT of the abdomen showed subcutaneous edema/hemorrhage of the right flank. Xray of the left shoulder showed a comminuted fx of the proximal humerus. Othropedics were consulted and determined no surgical intervention was needed for the left humerus fx was needed. Recommended that patient is to remain NWB to left arm at all times. Neurosurgery was also consulted and determined that no surgical intervention was needed for C2 fx. Recommended that a [place] J collar be worn when OOB only.   Swallow eval done on May 15. Found patient to have moderate dysphagia, recommended nectar thick liquids with mechanical soft diet. Recommend small sips/bites.     Patient deemed medically stable for transfer to acute IPR at [facility] on 5/19/2020. (19 May 2020 14:57)    ROS- CO diffuse pain Abd, ribs L shoulder  hallucinations with oxycontin for pain- dcd and changed to tylenol and prn tramadol  abd cramping better with immodium    Vital Signs Last 24 Hrs  T(C): 36.7 (21 May 2020 08:15), Max: 36.8 (20 May 2020 20:20)  T(F): 98.1 (21 May 2020 08:15), Max: 98.3 (20 May 2020 20:20)  HR: 83 (21 May 2020 08:15) (83 - 89)  BP: 138/83 (21 May 2020 08:15) (138/83 - 144/72)  BP(mean): --  RR: 14 (21 May 2020 08:15) (14 - 16)  SpO2: 93% (21 May 2020 08:15) (93% - 94%)    Physical Exam  GENERAL Exam:     Nontoxic , No Acute Distress Off O2 with [place] J on- pulse ox Rm air 94%  	HEENT:  normocephalic, atraumatic	  	LUNGS:	Clear bilaterally    	HEART:	 Normal S1S2   No murmur RRR        	GI/ ABDOMEN:  Soft,  +BS, tender to palpation  	EXTREMITIES:   +Edema  left upper extremity in sling  	MUSCULOSKELETAL: as above    	SKIN:      + Ecchymosis right elbow, partial skin loss with flap intact to right elbow. traumatic partial thickness skin loss over left shin,   No signs of skin breakdown or pressure wounds  RUE/BLES with FROM MS 5/5  LUE with Good ROM elbow>distally with MS 5/5 L shoulder not Tested  Sensation Intact to lt Touch throughout    Rehab- min A transfers   Min A 50' hemicane  Dependent ADL               labs                          11.5   8.61  )-----------( 199      ( 21 May 2020 10:25 )             34.1   05-21    140  |  103  |  14  ----------------------------<  119<H>  4.2   |  29  |  0.53    Ca    9.0      21 May 2020 10:25  Phos  3.4     05-20  Mg     1.8     05-20    TPro  6.2  /  Alb  2.8<L>  /  TBili  1.0  /  DBili  x   /  AST  27  /  ALT  26  /  AlkPhos  132<H>  05-20

## 2020-05-21 NOTE — PROGRESS NOTE ADULT - SUBJECTIVE AND OBJECTIVE BOX
Hiram Johnson M.D. Pager Number [phone]    Patient is a 80y old  Female who presents with a chief complaint of mutitrauma with c2 anterior wedge fx, t9 fx and left humeral fx (20 May 2020 11:03)      SUBJECTIVE / OVERNIGHT EVENTS:  Pt seen and examined at bedside. No acute events overnight.  Pt denies cp, palpitations, sob, abd pain, N/V, fever, chills.    ROS:  All other review of systems negative    Allergies    Valium (Unknown)    Intolerances        MEDICATIONS  (STANDING):  acetaminophen 325 mG/butalbital 50 mG/caffeine 40 mG 1 Tablet(s) Oral <User Schedule>  ascorbic acid 500 milliGRAM(s) Oral two times a day  aspirin enteric coated 81 milliGRAM(s) Oral daily  atorvastatin 40 milliGRAM(s) Oral at bedtime  cholecalciferol 1000 Unit(s) Oral daily  dicyclomine 20 milliGRAM(s) Oral three times a day before meals  enoxaparin Injectable 40 milliGRAM(s) SubCutaneous daily  escitalopram 20 milliGRAM(s) Oral daily  gabapentin 200 milliGRAM(s) Oral three times a day  latanoprost 0.005% Ophthalmic Solution 1 Drop(s) Both EYES at bedtime  levothyroxine 100 MICROGram(s) Oral daily  melatonin 6 milliGRAM(s) Oral at bedtime  multivitamin 1 Tablet(s) Oral daily  pantoprazole    Tablet 40 milliGRAM(s) Oral before breakfast  potassium chloride    Tablet ER 10 milliEquivalent(s) Oral two times a day  rifAXIMin 550 milliGRAM(s) Oral <User Schedule>  timolol 0.25% Solution 1 Drop(s) Both EYES two times a day    MEDICATIONS  (PRN):  acetaminophen   Tablet .. 650 milliGRAM(s) Oral every 6 hours PRN Mild Pain (1 - 3)  loperamide 2 milliGRAM(s) Oral every 6 hours PRN abdominal cramping from IBS  ondansetron   Disintegrating Tablet 4 milliGRAM(s) Oral every 6 hours PRN Nausea and/or Vomiting  traMADol 50 milliGRAM(s) Oral every 4 hours PRN Moderate Pain (4 - 6)      Vital Signs Last 24 Hrs  T(C): 36.8 (20 May 2020 20:20), Max: 36.8 (20 May 2020 20:20)  T(F): 98.3 (20 May 2020 20:20), Max: 98.3 (20 May 2020 20:20)  HR: 89 (20 May 2020 20:20) (89 - 89)  BP: 144/72 (20 May 2020 20:20) (144/72 - 144/72)  BP(mean): --  RR: 16 (20 May 2020 20:20) (16 - 16)  SpO2: 94% (20 May 2020 20:20) (94% - 94%)  CAPILLARY BLOOD GLUCOSE        I&O's Summary    20 May 2020 07:01  -  21 May 2020 07:00  --------------------------------------------------------  IN: 360 mL / OUT: 0 mL / NET: 360 mL        PHYSICAL EXAM:  GENERAL: NAD, Elderly female  NECK: C-collar off this AM in bed,  No JVD  CHEST/LUNG: Clear to auscultation bilaterally; No wheeze  HEART: Regular rate and rhythm; No murmurs, rubs, or gallops  ABDOMEN: Soft, Nontender, Nondistended; Bowel sounds present  EXTREMITIES:  2+ Peripheral Pulses, No clubbing, cyanosis, or edema. LUE in sling   MSK: B/L UE Limited ROM due to pain  PSYCH: AAOx3  NEUROLOGY: non-focal    LABS:                        10.3   7.11  )-----------( 137      ( 20 May 2020 05:10 )             31.8     05-20    140  |  101  |  13  ----------------------------<  109<H>  3.8   |  30  |  0.44<L>    Ca    8.6      20 May 2020 05:10  Phos  3.4     05-20  Mg     1.8     05-20    TPro  6.2  /  Alb  2.8<L>  /  TBili  1.0  /  DBili  x   /  AST  27  /  ALT  26  /  AlkPhos  132<H>  05-20              RADIOLOGY & ADDITIONAL TESTS:  Results Reviewed:   Imaging Personally Reviewed:  Electrocardiogram Personally Reviewed:    COORDINATION OF CARE:  Care Discussed with Consultants/Other Providers [Y/N]:  Prior or Outpatient Records Reviewed [Y/N]:

## 2020-05-21 NOTE — PROGRESS NOTE ADULT - ASSESSMENT
This is a 81 y/o female with PMHx of CAD s/p stenting, HLD, thyroid CA s/p thyroidectomy, migraines who had a fall down a flight of stairs resulting in a C2 anterior wedge fracture, T9 vertebral fx (age indeterminate), and left humeral fx who now has functional deficits secondary to these injuries consisting of gait and ADL impairments as well as dysphagia.     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

## 2020-05-21 NOTE — PROGRESS NOTE ADULT - ASSESSMENT
81yo female with hx of Hypothyroidism, glaucoma, HLD, presented to [facility] for acute rehab from [facility] after falling down a flight of stairs, noted to have C2 Fracture, Chronic rib fracture, T9 compression fracture (age indeterminate, left humeral fx.    #Functional deficits secondary to trauma/C2 wedge fx, left humeral fx, dysphagia   -Continue comprehensive acute IPR program consisting of PT, OT and ST  -Pain management with prn tylenol and prn Tramadol  -Sling to left arm ATC   -[place] J collar when OOB    #Delirium  -Opiate induced hallucination which has resolved  -Agree with discontinuing Oxy for pain control  -Pain control with Tylenol prn with escalation to Tramadol for severe pain    #Dysphagia  -Tolerating regular diet  -SLP for swallow eval     #Anemia  -Monitor H/H     #Thrombocytopenia  -Platelet 135  -Asymptomatic   -Check labs in morning     Migraines   -C/w Fioricet qAM    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

## 2020-05-22 PROCEDURE — 99232 SBSQ HOSP IP/OBS MODERATE 35: CPT

## 2020-05-22 RX ADMIN — Medication 20 MILLIGRAM(S): at 17:27

## 2020-05-22 RX ADMIN — Medication 6 MILLIGRAM(S): at 21:30

## 2020-05-22 RX ADMIN — Medication 650 MILLIGRAM(S): at 21:40

## 2020-05-22 RX ADMIN — Medication 1 DROP(S): at 17:32

## 2020-05-22 RX ADMIN — LATANOPROST 1 DROP(S): 0.05 SOLUTION/ DROPS OPHTHALMIC; TOPICAL at 21:30

## 2020-05-22 RX ADMIN — Medication 20 MILLIGRAM(S): at 05:48

## 2020-05-22 RX ADMIN — ATORVASTATIN CALCIUM 40 MILLIGRAM(S): 80 TABLET, FILM COATED ORAL at 21:30

## 2020-05-22 RX ADMIN — LIDOCAINE 1 PATCH: 4 CREAM TOPICAL at 17:28

## 2020-05-22 RX ADMIN — Medication 10 MILLIEQUIVALENT(S): at 17:27

## 2020-05-22 RX ADMIN — ENOXAPARIN SODIUM 40 MILLIGRAM(S): 100 INJECTION SUBCUTANEOUS at 12:33

## 2020-05-22 RX ADMIN — Medication 10 MILLIEQUIVALENT(S): at 05:48

## 2020-05-22 RX ADMIN — Medication 500 MILLIGRAM(S): at 17:27

## 2020-05-22 RX ADMIN — Medication 500 MILLIGRAM(S): at 05:48

## 2020-05-22 RX ADMIN — GABAPENTIN 200 MILLIGRAM(S): 400 CAPSULE ORAL at 13:17

## 2020-05-22 RX ADMIN — Medication 2 MILLIGRAM(S): at 08:26

## 2020-05-22 RX ADMIN — PANTOPRAZOLE SODIUM 40 MILLIGRAM(S): 20 TABLET, DELAYED RELEASE ORAL at 05:48

## 2020-05-22 RX ADMIN — Medication 1 DROP(S): at 05:48

## 2020-05-22 RX ADMIN — GABAPENTIN 200 MILLIGRAM(S): 400 CAPSULE ORAL at 21:30

## 2020-05-22 RX ADMIN — Medication 1000 UNIT(S): at 12:33

## 2020-05-22 RX ADMIN — GABAPENTIN 200 MILLIGRAM(S): 400 CAPSULE ORAL at 05:48

## 2020-05-22 RX ADMIN — LIDOCAINE 1 PATCH: 4 CREAM TOPICAL at 08:27

## 2020-05-22 RX ADMIN — LIDOCAINE 1 PATCH: 4 CREAM TOPICAL at 05:47

## 2020-05-22 RX ADMIN — Medication 1 TABLET(S): at 12:33

## 2020-05-22 RX ADMIN — Medication 100 MICROGRAM(S): at 05:48

## 2020-05-22 RX ADMIN — Medication 81 MILLIGRAM(S): at 12:33

## 2020-05-22 RX ADMIN — Medication 20 MILLIGRAM(S): at 12:33

## 2020-05-22 RX ADMIN — Medication 1 TABLET(S): at 08:26

## 2020-05-22 RX ADMIN — ESCITALOPRAM OXALATE 20 MILLIGRAM(S): 10 TABLET, FILM COATED ORAL at 12:33

## 2020-05-22 NOTE — PROGRESS NOTE ADULT - ASSESSMENT
This is a 79 y/o female with PMHx of CAD s/p stenting, HLD, thyroid CA s/p thyroidectomy, migraines who had a fall down a flight of stairs resulting in a C2 anterior wedge fracture, T9 vertebral fx (age indeterminate), and left humeral fx who now has functional deficits secondary to these injuries consisting of gait and ADL impairments as well as dysphagia.     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

## 2020-05-22 NOTE — PROGRESS NOTE ADULT - SUBJECTIVE AND OBJECTIVE BOX
HPI:  This is a 79 y/o female who presented to the ED at [facility] after falling down a flight of stairs. Patient reports that she was getting up to use the restroom and while reaching for a light at the top of the stairs, lost her balance and fell down the stairs. In the ED, she had complaints of right chest wall pain, right elbow pain, and left shoulder pain. CTH was negative, CT of the spine showed corner fx of the anterior aspect of the base of C2. CT chest showed many chronic rib fractures and a T 9 compression fx (indeterminate age). CT of the abdomen showed subcutaneous edema/hemorrhage of the right flank. Xray of the left shoulder showed a comminuted fx of the proximal humerus. Othropedics were consulted and determined no surgical intervention was needed for the left humerus fx was needed. Recommended that patient is to remain NWB to left arm at all times. Neurosurgery was also consulted and determined that no surgical intervention was needed for C2 fx. Recommended that a [place] J collar be worn when OOB only.   Swallow eval done on May 15. Found patient to have moderate dysphagia, recommended nectar thick liquids with mechanical soft diet. Recommend small sips/bites.     Patient deemed medically stable for transfer to acute IPR at [facility] on 5/19/2020. (19 May 2020 14:57)      Subjective    No complaints.     PAST MEDICAL & SURGICAL HISTORY:  IBS (irritable bowel syndrome)  H/O migraine  Skin cancer  Kidney stone  Thyroid cancer  HLD (hyperlipidemia)  CAD (coronary artery disease)  S/P cholecystectomy  S/P hysterectomy  History of local excision of skin lesion  H/O lithotripsy  S/P thyroidectomy  Stented coronary artery      MedsMEDICATIONS  (STANDING):  acetaminophen 325 mG/butalbital 50 mG/caffeine 40 mG 1 Tablet(s) Oral <User Schedule>  ascorbic acid 500 milliGRAM(s) Oral two times a day  aspirin enteric coated 81 milliGRAM(s) Oral daily  atorvastatin 40 milliGRAM(s) Oral at bedtime  cholecalciferol 1000 Unit(s) Oral daily  dicyclomine 20 milliGRAM(s) Oral three times a day before meals  enoxaparin Injectable 40 milliGRAM(s) SubCutaneous daily  escitalopram 20 milliGRAM(s) Oral daily  gabapentin 200 milliGRAM(s) Oral three times a day  latanoprost 0.005% Ophthalmic Solution 1 Drop(s) Both EYES at bedtime  levothyroxine 100 MICROGram(s) Oral daily  lidocaine   Patch 1 Patch Transdermal <User Schedule>  melatonin 6 milliGRAM(s) Oral at bedtime  multivitamin 1 Tablet(s) Oral daily  pantoprazole    Tablet 40 milliGRAM(s) Oral before breakfast  potassium chloride    Tablet ER 10 milliEquivalent(s) Oral two times a day  rifAXIMin 550 milliGRAM(s) Oral <User Schedule>  timolol 0.25% Solution 1 Drop(s) Both EYES two times a day    MEDICATIONS  (PRN):  acetaminophen   Tablet .. 650 milliGRAM(s) Oral every 6 hours PRN Mild Pain (1 - 3)  loperamide 2 milliGRAM(s) Oral every 6 hours PRN abdominal cramping from IBS  ondansetron   Disintegrating Tablet 4 milliGRAM(s) Oral every 6 hours PRN Nausea and/or Vomiting  traMADol 50 milliGRAM(s) Oral every 4 hours PRN Moderate Pain (4 - 6)      Vital Signs Last 24 Hrs  T(C): 37.1 (22 May 2020 08:50), Max: 37.1 (21 May 2020 20:53)  T(F): 98.7 (22 May 2020 08:50), Max: 98.7 (21 May 2020 20:53)  HR: 99 (22 May 2020 08:50) (80 - 99)  BP: 109/74 (22 May 2020 08:50) (109/74 - 137/80)  BP(mean): --  RR: 14 (22 May 2020 08:50) (14 - 14)  SpO2: 92% (22 May 2020 08:50) (92% - 94%)  I&O's Summary    22 May 2020 07:01  -  22 May 2020 12:36  --------------------------------------------------------  IN: 360 mL / OUT: 0 mL / NET: 360 mL        PHYSICAL EXAM:  GENERAL: NAD  NECK: Supple  NERVOUS SYSTEM:  awake and alert  HEART: S1s2 NL , RRR  CHEST/LUNG: Clear to percussion bilaterally  ABDOMEN: Soft, Nontender, Nondistended; Bowel sounds present  EXTREMITIES:  No edema      LABS:(05-21 @ 10:25)                      11.5  8.61 )-----------( 199                 34.1    Neutrophils = -- (--%)  Lymphocytes = -- (--%)  Eosinophils = -- (--%)  Basophils = -- (--%)  Monocytes = -- (--%)  Bands = --%    05-21    140  |  103  |  14  ----------------------------<  119<H>  4.2   |  29  |  0.53    Ca    9.0      21 May 2020 10:25        Care Discussed with Consultants/Other Providers [ x] YES  [ ] NO    Multiple fx sec to fall  PT/OT per rehab  pain control    Thrombocytopenia  Resolved    Anemia  Improved   Monitor for now    DVT ppx  Lovenox

## 2020-05-22 NOTE — PROGRESS NOTE ADULT - SUBJECTIVE AND OBJECTIVE BOX
HPI:  This is a 79 y/o female who presented to the ED at [facility] after falling down a flight of stairs. Patient reports that she was getting up to use the restroom and while reaching for a light at the top of the stairs, lost her balance and fell down the stairs. In the ED, she had complaints of right chest wall pain, right elbow pain, and left shoulder pain. CTH was negative, CT of the spine showed corner fx of the anterior aspect of the base of C2. CT chest showed many chronic rib fractures and a T 9 compression fx (indeterminate age). CT of the abdomen showed subcutaneous edema/hemorrhage of the right flank. Xray of the left shoulder showed a comminuted fx of the proximal humerus. Othropedics were consulted and determined no surgical intervention was needed for the left humerus fx was needed. Recommended that patient is to remain NWB to left arm at all times. Neurosurgery was also consulted and determined that no surgical intervention was needed for C2 fx. Recommended that a [place] J collar be worn when OOB only.   Swallow eval done on May 15. Found patient to have moderate dysphagia, recommended nectar thick liquids with mechanical soft diet. Recommend small sips/bites.     Patient deemed medically stable for transfer to acute IPR at [facility] on 5/19/2020. (19 May 2020 14:57)    ROS- feels better- no further hallucinations, +BM yesterday, pain controlled with tramadol    Vital Signs Last 24 Hrs  T(C): 37.1 (22 May 2020 08:50), Max: 37.1 (21 May 2020 20:53)  T(F): 98.7 (22 May 2020 08:50), Max: 98.7 (21 May 2020 20:53)  HR: 99 (22 May 2020 08:50) (80 - 99)  BP: 109/74 (22 May 2020 08:50) (109/74 - 137/80)  BP(mean): --  RR: 14 (22 May 2020 08:50) (14 - 14)  SpO2: 92% (22 May 2020 08:50) (92% - 94%)    Physical Exam  GENERAL Exam:     Nontoxic , No Acute Distress Off O2 with [place] J on  	HEENT:  normocephalic, atraumatic	  	LUNGS:	Clear bilaterally    	HEART:	 Normal S1S2   No murmur RRR        	GI/ ABDOMEN:  Soft,  +BS, tender to palpation  	EXTREMITIES:   +Edema  left upper extremity in sling  	MUSCULOSKELETAL: as above    	SKIN:      + Ecchymosis right elbow, partial skin loss with flap intact to right elbow. traumatic partial thickness skin loss over left shin,   No signs of skin breakdown or pressure wounds  RUE/BLES with FROM MS 5/5  LUE with Good ROM elbow>distally with MS 5/5 L shoulder not Tested  Sensation Intact to lt Touch throughout    Rehab- min A transfers   Min A 50' hemicane  Dependent ADL               labs                          11.5   8.61  )-----------( 199      ( 21 May 2020 10:25 )             34.1   05-21    140  |  103  |  14  ----------------------------<  119<H>  4.2   |  29  |  0.53    Ca    9.0      21 May 2020 10:25  Phos  3.4     05-20  Mg     1.8     05-20    TPro  6.2  /  Alb  2.8<L>  /  TBili  1.0  /  DBili  x   /  AST  27  /  ALT  26  /  AlkPhos  132<H>  05-20

## 2020-05-23 PROCEDURE — 99232 SBSQ HOSP IP/OBS MODERATE 35: CPT

## 2020-05-23 RX ORDER — LANOLIN ALCOHOL/MO/W.PET/CERES
6 CREAM (GRAM) TOPICAL
Refills: 0 | Status: DISCONTINUED | OUTPATIENT
Start: 2020-05-23 | End: 2020-05-30

## 2020-05-23 RX ADMIN — Medication 10 MILLIEQUIVALENT(S): at 06:10

## 2020-05-23 RX ADMIN — Medication 6 MILLIGRAM(S): at 21:23

## 2020-05-23 RX ADMIN — PANTOPRAZOLE SODIUM 40 MILLIGRAM(S): 20 TABLET, DELAYED RELEASE ORAL at 06:10

## 2020-05-23 RX ADMIN — TRAMADOL HYDROCHLORIDE 50 MILLIGRAM(S): 50 TABLET ORAL at 14:34

## 2020-05-23 RX ADMIN — Medication 81 MILLIGRAM(S): at 14:36

## 2020-05-23 RX ADMIN — GABAPENTIN 200 MILLIGRAM(S): 400 CAPSULE ORAL at 21:23

## 2020-05-23 RX ADMIN — Medication 10 MILLIEQUIVALENT(S): at 17:20

## 2020-05-23 RX ADMIN — Medication 500 MILLIGRAM(S): at 17:21

## 2020-05-23 RX ADMIN — Medication 20 MILLIGRAM(S): at 06:13

## 2020-05-23 RX ADMIN — GABAPENTIN 200 MILLIGRAM(S): 400 CAPSULE ORAL at 06:10

## 2020-05-23 RX ADMIN — LIDOCAINE 1 PATCH: 4 CREAM TOPICAL at 06:14

## 2020-05-23 RX ADMIN — Medication 500 MILLIGRAM(S): at 06:10

## 2020-05-23 RX ADMIN — ENOXAPARIN SODIUM 40 MILLIGRAM(S): 100 INJECTION SUBCUTANEOUS at 14:35

## 2020-05-23 RX ADMIN — Medication 1 TABLET(S): at 14:35

## 2020-05-23 RX ADMIN — GABAPENTIN 200 MILLIGRAM(S): 400 CAPSULE ORAL at 14:35

## 2020-05-23 RX ADMIN — ATORVASTATIN CALCIUM 40 MILLIGRAM(S): 80 TABLET, FILM COATED ORAL at 21:23

## 2020-05-23 RX ADMIN — TRAMADOL HYDROCHLORIDE 50 MILLIGRAM(S): 50 TABLET ORAL at 09:21

## 2020-05-23 RX ADMIN — Medication 100 MICROGRAM(S): at 06:11

## 2020-05-23 RX ADMIN — ONDANSETRON 4 MILLIGRAM(S): 8 TABLET, FILM COATED ORAL at 09:21

## 2020-05-23 RX ADMIN — Medication 650 MILLIGRAM(S): at 21:23

## 2020-05-23 RX ADMIN — ESCITALOPRAM OXALATE 20 MILLIGRAM(S): 10 TABLET, FILM COATED ORAL at 14:35

## 2020-05-23 RX ADMIN — LIDOCAINE 1 PATCH: 4 CREAM TOPICAL at 08:59

## 2020-05-23 RX ADMIN — Medication 1 TABLET(S): at 08:59

## 2020-05-23 RX ADMIN — LIDOCAINE 1 PATCH: 4 CREAM TOPICAL at 20:22

## 2020-05-23 RX ADMIN — LATANOPROST 1 DROP(S): 0.05 SOLUTION/ DROPS OPHTHALMIC; TOPICAL at 21:23

## 2020-05-23 RX ADMIN — Medication 1 DROP(S): at 06:11

## 2020-05-23 RX ADMIN — Medication 20 MILLIGRAM(S): at 14:36

## 2020-05-23 RX ADMIN — Medication 1000 UNIT(S): at 14:36

## 2020-05-23 RX ADMIN — Medication 20 MILLIGRAM(S): at 17:20

## 2020-05-23 RX ADMIN — Medication 1 DROP(S): at 17:21

## 2020-05-23 NOTE — PROGRESS NOTE ADULT - SUBJECTIVE AND OBJECTIVE BOX
HPI:  This is a 79 y/o female who presented to the ED at [facility] after falling down a flight of stairs. Patient reports that she was getting up to use the restroom and while reaching for a light at the top of the stairs, lost her balance and fell down the stairs. In the ED, she had complaints of right chest wall pain, right elbow pain, and left shoulder pain. CTH was negative, CT of the spine showed corner fx of the anterior aspect of the base of C2. CT chest showed many chronic rib fractures and a T 9 compression fx (indeterminate age). CT of the abdomen showed subcutaneous edema/hemorrhage of the right flank. Xray of the left shoulder showed a comminuted fx of the proximal humerus. Othropedics were consulted and determined no surgical intervention was needed for the left humerus fx was needed. Recommended that patient is to remain NWB to left arm at all times. Neurosurgery was also consulted and determined that no surgical intervention was needed for C2 fx. Recommended that a [place] J collar be worn when OOB only.   Swallow eval done on May 15. Found patient to have moderate dysphagia, recommended nectar thick liquids with mechanical soft diet. Recommend small sips/bites.     Patient deemed medically stable for transfer to acute IPR at [facility] on 5/19/2020. (19 May 2020 14:57)      Subjective    c/o b/l feet numbness X 1 day.   c/o SSCP X 2 days.       PAST MEDICAL & SURGICAL HISTORY:  IBS (irritable bowel syndrome)  H/O migraine  Skin cancer  Kidney stone  Thyroid cancer  HLD (hyperlipidemia)  CAD (coronary artery disease)  S/P cholecystectomy  S/P hysterectomy  History of local excision of skin lesion  H/O lithotripsy  S/P thyroidectomy  Stented coronary artery      MedsMEDICATIONS  (STANDING):  acetaminophen 325 mG/butalbital 50 mG/caffeine 40 mG 1 Tablet(s) Oral <User Schedule>  ascorbic acid 500 milliGRAM(s) Oral two times a day  aspirin enteric coated 81 milliGRAM(s) Oral daily  atorvastatin 40 milliGRAM(s) Oral at bedtime  cholecalciferol 1000 Unit(s) Oral daily  dicyclomine 20 milliGRAM(s) Oral three times a day before meals  enoxaparin Injectable 40 milliGRAM(s) SubCutaneous daily  escitalopram 20 milliGRAM(s) Oral daily  gabapentin 200 milliGRAM(s) Oral three times a day  latanoprost 0.005% Ophthalmic Solution 1 Drop(s) Both EYES at bedtime  levothyroxine 100 MICROGram(s) Oral daily  lidocaine   Patch 1 Patch Transdermal <User Schedule>  melatonin 6 milliGRAM(s) Oral <User Schedule>  multivitamin 1 Tablet(s) Oral daily  pantoprazole    Tablet 40 milliGRAM(s) Oral before breakfast  potassium chloride    Tablet ER 10 milliEquivalent(s) Oral two times a day  rifAXIMin 550 milliGRAM(s) Oral <User Schedule>  timolol 0.25% Solution 1 Drop(s) Both EYES two times a day    MEDICATIONS  (PRN):  acetaminophen   Tablet .. 650 milliGRAM(s) Oral every 6 hours PRN Mild Pain (1 - 3)  loperamide 2 milliGRAM(s) Oral every 6 hours PRN abdominal cramping from IBS  ondansetron   Disintegrating Tablet 4 milliGRAM(s) Oral every 6 hours PRN Nausea and/or Vomiting  traMADol 50 milliGRAM(s) Oral every 4 hours PRN Moderate Pain (4 - 6)      Vital Signs Last 24 Hrs  T(C): 36.8 (23 May 2020 10:14), Max: 37.4 (22 May 2020 21:28)  T(F): 98.2 (23 May 2020 10:14), Max: 99.4 (22 May 2020 21:28)  HR: 78 (23 May 2020 10:14) (71 - 91)  BP: 134/71 (23 May 2020 10:14) (131/79 - 138/84)  BP(mean): --  RR: 16 (23 May 2020 10:14) (16 - 17)  SpO2: 92% (23 May 2020 10:14) (92% - 93%)  I&O's Summary    22 May 2020 07:01  -  23 May 2020 07:00  --------------------------------------------------------  IN: 600 mL / OUT: 0 mL / NET: 600 mL        PHYSICAL EXAM:  GENERAL: NAD  NECK: Supple  NERVOUS SYSTEM:  awake and alert  HEART: S1s2 NL , RRR  CHEST/LUNG: Clear to percussion bilaterally  ABDOMEN: Soft, Nontender, Nondistended; Bowel sounds present  EXTREMITIES:  No edema        Care Discussed with Consultants/Other Providers [ x] YES  [ ] NO    Multiple fx sec to fall  PT/OT per rehab  pain control    Thrombocytopenia  Resolved    Anemia  Improved   Monitor for now    CP, atypical  EKG    b/l feet numbness  Neurontin    DVT ppx  Lovenox

## 2020-05-23 NOTE — PROGRESS NOTE ADULT - SUBJECTIVE AND OBJECTIVE BOX
No overnight events.  Slept well.  Reports pain is controlled with medications.  Slept later than usual.   Will modify melatonin to 8PM.     REVIEW OF SYSTEMS  Constitutional - No fever,  No fatigue  Neurological - No headaches, +loss of strength  Musculoskeletal - +joint pain, No joint swelling, +muscle pain    VITALS  T(C): 37.4 (05-22-20 @ 21:28), Max: 37.4 (05-22-20 @ 21:28)  HR: 71 (05-23-20 @ 06:19) (71 - 91)  BP: 131/79 (05-23-20 @ 06:19) (131/79 - 138/84)  RR: 17 (05-22-20 @ 21:28) (17 - 17)  SpO2: 93% (05-22-20 @ 21:28) (93% - 93%)  Wt(kg): --       MEDICATIONS   acetaminophen   Tablet .. 650 milliGRAM(s) every 6 hours PRN  acetaminophen 325 mG/butalbital 50 mG/caffeine 40 mG 1 Tablet(s) <User Schedule>  ascorbic acid 500 milliGRAM(s) two times a day  aspirin enteric coated 81 milliGRAM(s) daily  atorvastatin 40 milliGRAM(s) at bedtime  cholecalciferol 1000 Unit(s) daily  dicyclomine 20 milliGRAM(s) three times a day before meals  enoxaparin Injectable 40 milliGRAM(s) daily  escitalopram 20 milliGRAM(s) daily  gabapentin 200 milliGRAM(s) three times a day  latanoprost 0.005% Ophthalmic Solution 1 Drop(s) at bedtime  levothyroxine 100 MICROGram(s) daily  lidocaine   Patch 1 Patch <User Schedule>  loperamide 2 milliGRAM(s) every 6 hours PRN  melatonin 6 milliGRAM(s) at bedtime  multivitamin 1 Tablet(s) daily  ondansetron   Disintegrating Tablet 4 milliGRAM(s) every 6 hours PRN  pantoprazole    Tablet 40 milliGRAM(s) before breakfast  potassium chloride    Tablet ER 10 milliEquivalent(s) two times a day  rifAXIMin 550 milliGRAM(s) <User Schedule>  timolol 0.25% Solution 1 Drop(s) two times a day  traMADol 50 milliGRAM(s) every 4 hours PRN      RECENT LABS/IMAGING - Ind Reviewed                        11.5   8.61  )-----------( 199      ( 21 May 2020 10:25 )             34.1     05-21    140  |  103  |  14  ----------------------------<  119<H>  4.2   |  29  |  0.53    Ca    9.0      21 May 2020 10:25                  ---------  PHYSICAL EXAM  Constitutional - NAD, Comfortable  Pulm - Breathing comfortably, No wheezing  Abd - Soft   Extremities - No edema   Neurologic Exam -                    Cognitive - Awake, Alert     Motor - Left Ue weakness due to pain  Psychiatric - Mood anxious    ASSESSMENT/PLAN  80y Female with functional deficits after multiple trauma after a fall  CAD - ASA, Lipitor  DM2 - ISS  Sleep - Melatonin  Mood -  Lexapro  Pain - Tylenol, Lidoderm, Neurontin, Tramadol  DVT PPX - SCD, Lovenox    Continue 3hrs a day of comprehensive rehab program.

## 2020-05-24 PROCEDURE — 99232 SBSQ HOSP IP/OBS MODERATE 35: CPT

## 2020-05-24 PROCEDURE — 93010 ELECTROCARDIOGRAM REPORT: CPT

## 2020-05-24 RX ORDER — SENNA PLUS 8.6 MG/1
2 TABLET ORAL AT BEDTIME
Refills: 0 | Status: DISCONTINUED | OUTPATIENT
Start: 2020-05-24 | End: 2020-05-30

## 2020-05-24 RX ADMIN — LIDOCAINE 1 PATCH: 4 CREAM TOPICAL at 17:44

## 2020-05-24 RX ADMIN — LATANOPROST 1 DROP(S): 0.05 SOLUTION/ DROPS OPHTHALMIC; TOPICAL at 21:04

## 2020-05-24 RX ADMIN — Medication 1 DROP(S): at 05:57

## 2020-05-24 RX ADMIN — Medication 10 MILLIEQUIVALENT(S): at 05:57

## 2020-05-24 RX ADMIN — Medication 6 MILLIGRAM(S): at 21:02

## 2020-05-24 RX ADMIN — Medication 20 MILLIGRAM(S): at 12:43

## 2020-05-24 RX ADMIN — Medication 20 MILLIGRAM(S): at 18:02

## 2020-05-24 RX ADMIN — ESCITALOPRAM OXALATE 20 MILLIGRAM(S): 10 TABLET, FILM COATED ORAL at 12:43

## 2020-05-24 RX ADMIN — Medication 1 TABLET(S): at 08:45

## 2020-05-24 RX ADMIN — TRAMADOL HYDROCHLORIDE 50 MILLIGRAM(S): 50 TABLET ORAL at 18:01

## 2020-05-24 RX ADMIN — ATORVASTATIN CALCIUM 40 MILLIGRAM(S): 80 TABLET, FILM COATED ORAL at 21:02

## 2020-05-24 RX ADMIN — GABAPENTIN 200 MILLIGRAM(S): 400 CAPSULE ORAL at 05:57

## 2020-05-24 RX ADMIN — LIDOCAINE 1 PATCH: 4 CREAM TOPICAL at 08:45

## 2020-05-24 RX ADMIN — ENOXAPARIN SODIUM 40 MILLIGRAM(S): 100 INJECTION SUBCUTANEOUS at 12:43

## 2020-05-24 RX ADMIN — Medication 10 MILLIEQUIVALENT(S): at 18:02

## 2020-05-24 RX ADMIN — Medication 81 MILLIGRAM(S): at 12:42

## 2020-05-24 RX ADMIN — GABAPENTIN 200 MILLIGRAM(S): 400 CAPSULE ORAL at 12:45

## 2020-05-24 RX ADMIN — Medication 20 MILLIGRAM(S): at 08:45

## 2020-05-24 RX ADMIN — Medication 1000 UNIT(S): at 12:43

## 2020-05-24 RX ADMIN — Medication 500 MILLIGRAM(S): at 18:02

## 2020-05-24 RX ADMIN — GABAPENTIN 200 MILLIGRAM(S): 400 CAPSULE ORAL at 21:02

## 2020-05-24 RX ADMIN — Medication 650 MILLIGRAM(S): at 21:02

## 2020-05-24 RX ADMIN — PANTOPRAZOLE SODIUM 40 MILLIGRAM(S): 20 TABLET, DELAYED RELEASE ORAL at 05:57

## 2020-05-24 RX ADMIN — Medication 1 DROP(S): at 18:02

## 2020-05-24 RX ADMIN — Medication 1 TABLET(S): at 12:42

## 2020-05-24 RX ADMIN — Medication 500 MILLIGRAM(S): at 05:57

## 2020-05-24 RX ADMIN — Medication 100 MICROGRAM(S): at 05:57

## 2020-05-24 RX ADMIN — LIDOCAINE 1 PATCH: 4 CREAM TOPICAL at 20:56

## 2020-05-24 NOTE — PROGRESS NOTE ADULT - SUBJECTIVE AND OBJECTIVE BOX
HPI:  This is a 81 y/o female who presented to the ED at [facility] after falling down a flight of stairs. Patient reports that she was getting up to use the restroom and while reaching for a light at the top of the stairs, lost her balance and fell down the stairs. In the ED, she had complaints of right chest wall pain, right elbow pain, and left shoulder pain. CTH was negative, CT of the spine showed corner fx of the anterior aspect of the base of C2. CT chest showed many chronic rib fractures and a T 9 compression fx (indeterminate age). CT of the abdomen showed subcutaneous edema/hemorrhage of the right flank. Xray of the left shoulder showed a comminuted fx of the proximal humerus. Othropedics were consulted and determined no surgical intervention was needed for the left humerus fx was needed. Recommended that patient is to remain NWB to left arm at all times. Neurosurgery was also consulted and determined that no surgical intervention was needed for C2 fx. Recommended that a [place] J collar be worn when OOB only.   Swallow eval done on May 15. Found patient to have moderate dysphagia, recommended nectar thick liquids with mechanical soft diet. Recommend small sips/bites.     Patient deemed medically stable for transfer to acute IPR at [facility] on 5/19/2020. (19 May 2020 14:57)      Subjective  CP better. EKG not done yet.   feet numbness little better      PAST MEDICAL & SURGICAL HISTORY:  IBS (irritable bowel syndrome)  H/O migraine  Skin cancer  Kidney stone  Thyroid cancer  HLD (hyperlipidemia)  CAD (coronary artery disease)  S/P cholecystectomy  S/P hysterectomy  History of local excision of skin lesion  H/O lithotripsy  S/P thyroidectomy  Stented coronary artery      MedsMEDICATIONS  (STANDING):  acetaminophen 325 mG/butalbital 50 mG/caffeine 40 mG 1 Tablet(s) Oral <User Schedule>  ascorbic acid 500 milliGRAM(s) Oral two times a day  aspirin enteric coated 81 milliGRAM(s) Oral daily  atorvastatin 40 milliGRAM(s) Oral at bedtime  cholecalciferol 1000 Unit(s) Oral daily  dicyclomine 20 milliGRAM(s) Oral three times a day before meals  enoxaparin Injectable 40 milliGRAM(s) SubCutaneous daily  escitalopram 20 milliGRAM(s) Oral daily  gabapentin 200 milliGRAM(s) Oral three times a day  latanoprost 0.005% Ophthalmic Solution 1 Drop(s) Both EYES at bedtime  levothyroxine 100 MICROGram(s) Oral daily  lidocaine   Patch 1 Patch Transdermal <User Schedule>  melatonin 6 milliGRAM(s) Oral <User Schedule>  multivitamin 1 Tablet(s) Oral daily  pantoprazole    Tablet 40 milliGRAM(s) Oral before breakfast  potassium chloride    Tablet ER 10 milliEquivalent(s) Oral two times a day  rifAXIMin 550 milliGRAM(s) Oral <User Schedule>  timolol 0.25% Solution 1 Drop(s) Both EYES two times a day    MEDICATIONS  (PRN):  acetaminophen   Tablet .. 650 milliGRAM(s) Oral every 6 hours PRN Mild Pain (1 - 3)  loperamide 2 milliGRAM(s) Oral every 6 hours PRN abdominal cramping from IBS  ondansetron   Disintegrating Tablet 4 milliGRAM(s) Oral every 6 hours PRN Nausea and/or Vomiting  senna 2 Tablet(s) Oral at bedtime PRN Constipation  traMADol 50 milliGRAM(s) Oral every 4 hours PRN Moderate Pain (4 - 6)      Vital Signs Last 24 Hrs  T(C): 36.8 (24 May 2020 09:04), Max: 37 (23 May 2020 20:48)  T(F): 98.3 (24 May 2020 09:04), Max: 98.6 (23 May 2020 20:48)  HR: 71 (24 May 2020 09:04) (71 - 88)  BP: 144/82 (24 May 2020 09:04) (131/75 - 148/79)  BP(mean): --  RR: 16 (24 May 2020 09:04) (15 - 16)  SpO2: 92% (24 May 2020 09:04) (92% - 92%)  I&O's Summary    23 May 2020 07:01  -  24 May 2020 07:00  --------------------------------------------------------  IN: 0 mL / OUT: 1 mL / NET: -1 mL        PHYSICAL EXAM:  GENERAL: NAD  NECK: Supple  NERVOUS SYSTEM:  awake and alert  HEART: S1s2 NL , RRR  CHEST/LUNG: Clear to percussion bilaterally  ABDOMEN: Soft, Nontender, Nondistended; Bowel sounds present  EXTREMITIES:  No edema    Care Discussed with Consultants/Other Providers [ x] YES  [ ] NO      Multiple fx sec to fall  PT/OT per rehab  pain control    Thrombocytopenia  Resolved    Anemia  Improved   Monitor for now    CP, atypical  EKG, ordered on 19th and 23rd. informed nurse again.     b/l feet numbness  Neurontin    DVT ppx  Lovenox

## 2020-05-24 NOTE — PROGRESS NOTE ADULT - SUBJECTIVE AND OBJECTIVE BOX
No overnight events.  Slept well.  Reported some cramping in her abdomen, which have now resolved.     REVIEW OF SYSTEMS  Constitutional - No fever,  +fatigue  Neurological - No headaches, +loss of strength  Musculoskeletal - +joint pain, No joint swelling, +muscle pain      VITALS  T(C): 36.8 (05-24-20 @ 09:04), Max: 37 (05-23-20 @ 20:48)  HR: 71 (05-24-20 @ 09:04) (71 - 88)  BP: 144/82 (05-24-20 @ 09:04) (131/75 - 148/79)  RR: 16 (05-24-20 @ 09:04) (15 - 16)  SpO2: 92% (05-24-20 @ 09:04) (92% - 92%)  Wt(kg): --        MEDICATIONS   acetaminophen   Tablet .. 650 milliGRAM(s) every 6 hours PRN  acetaminophen 325 mG/butalbital 50 mG/caffeine 40 mG 1 Tablet(s) <User Schedule>  ascorbic acid 500 milliGRAM(s) two times a day  aspirin enteric coated 81 milliGRAM(s) daily  atorvastatin 40 milliGRAM(s) at bedtime  cholecalciferol 1000 Unit(s) daily  dicyclomine 20 milliGRAM(s) three times a day before meals  enoxaparin Injectable 40 milliGRAM(s) daily  escitalopram 20 milliGRAM(s) daily  gabapentin 200 milliGRAM(s) three times a day  latanoprost 0.005% Ophthalmic Solution 1 Drop(s) at bedtime  levothyroxine 100 MICROGram(s) daily  lidocaine   Patch 1 Patch <User Schedule>  loperamide 2 milliGRAM(s) every 6 hours PRN  melatonin 6 milliGRAM(s) <User Schedule>  multivitamin 1 Tablet(s) daily  ondansetron   Disintegrating Tablet 4 milliGRAM(s) every 6 hours PRN  pantoprazole    Tablet 40 milliGRAM(s) before breakfast  potassium chloride    Tablet ER 10 milliEquivalent(s) two times a day  rifAXIMin 550 milliGRAM(s) <User Schedule>  timolol 0.25% Solution 1 Drop(s) two times a day  traMADol 50 milliGRAM(s) every 4 hours PRN      RECENT LABS/IMAGING - Independently Reviewed                      ---------  PHYSICAL EXAM  Constitutional - NAD, Comfortable  Pulm - Breathing comfortably, No wheezing  Abd - Soft   Extremities - No edema   Neurologic Exam -                    Cognitive - Awake, Alert     Motor - Left Ue weakness due to pain  Psychiatric - Mood anxious    ASSESSMENT/PLAN  80y Female with functional deficits after multiple trauma after a fall  CAD - ASA, Lipitor  DM2 - ISS  Sleep - Melatonin  Mood -  Lexapro  Pain - Tylenol, Lidoderm, Neurontin, Tramadol  DVT PPX - SCD, Lovenox    Continue 3hrs a day of comprehensive rehab program.

## 2020-05-25 PROCEDURE — 99233 SBSQ HOSP IP/OBS HIGH 50: CPT

## 2020-05-25 RX ORDER — GABAPENTIN 400 MG/1
300 CAPSULE ORAL THREE TIMES A DAY
Refills: 0 | Status: DISCONTINUED | OUTPATIENT
Start: 2020-05-25 | End: 2020-05-30

## 2020-05-25 RX ORDER — LIDOCAINE 4 G/100G
2 CREAM TOPICAL
Refills: 0 | Status: DISCONTINUED | OUTPATIENT
Start: 2020-05-25 | End: 2020-05-30

## 2020-05-25 RX ORDER — ACETAMINOPHEN 500 MG
975 TABLET ORAL
Refills: 0 | Status: DISCONTINUED | OUTPATIENT
Start: 2020-05-25 | End: 2020-05-30

## 2020-05-25 RX ADMIN — ENOXAPARIN SODIUM 40 MILLIGRAM(S): 100 INJECTION SUBCUTANEOUS at 12:00

## 2020-05-25 RX ADMIN — ESCITALOPRAM OXALATE 20 MILLIGRAM(S): 10 TABLET, FILM COATED ORAL at 12:00

## 2020-05-25 RX ADMIN — Medication 6 MILLIGRAM(S): at 20:20

## 2020-05-25 RX ADMIN — Medication 1 TABLET(S): at 12:00

## 2020-05-25 RX ADMIN — Medication 20 MILLIGRAM(S): at 11:23

## 2020-05-25 RX ADMIN — TRAMADOL HYDROCHLORIDE 50 MILLIGRAM(S): 50 TABLET ORAL at 05:26

## 2020-05-25 RX ADMIN — Medication 1000 UNIT(S): at 12:01

## 2020-05-25 RX ADMIN — ATORVASTATIN CALCIUM 40 MILLIGRAM(S): 80 TABLET, FILM COATED ORAL at 20:20

## 2020-05-25 RX ADMIN — LIDOCAINE 2 PATCH: 4 CREAM TOPICAL at 21:20

## 2020-05-25 RX ADMIN — Medication 20 MILLIGRAM(S): at 16:01

## 2020-05-25 RX ADMIN — LATANOPROST 1 DROP(S): 0.05 SOLUTION/ DROPS OPHTHALMIC; TOPICAL at 20:20

## 2020-05-25 RX ADMIN — LIDOCAINE 2 PATCH: 4 CREAM TOPICAL at 19:20

## 2020-05-25 RX ADMIN — Medication 81 MILLIGRAM(S): at 12:00

## 2020-05-25 RX ADMIN — Medication 20 MILLIGRAM(S): at 05:26

## 2020-05-25 RX ADMIN — ONDANSETRON 4 MILLIGRAM(S): 8 TABLET, FILM COATED ORAL at 10:57

## 2020-05-25 RX ADMIN — GABAPENTIN 300 MILLIGRAM(S): 400 CAPSULE ORAL at 20:20

## 2020-05-25 RX ADMIN — Medication 1 DROP(S): at 17:13

## 2020-05-25 RX ADMIN — Medication 500 MILLIGRAM(S): at 05:26

## 2020-05-25 RX ADMIN — PANTOPRAZOLE SODIUM 40 MILLIGRAM(S): 20 TABLET, DELAYED RELEASE ORAL at 05:26

## 2020-05-25 RX ADMIN — LIDOCAINE 2 PATCH: 4 CREAM TOPICAL at 09:12

## 2020-05-25 RX ADMIN — GABAPENTIN 300 MILLIGRAM(S): 400 CAPSULE ORAL at 13:06

## 2020-05-25 RX ADMIN — Medication 1 TABLET(S): at 08:32

## 2020-05-25 RX ADMIN — Medication 500 MILLIGRAM(S): at 17:11

## 2020-05-25 RX ADMIN — LIDOCAINE 1 PATCH: 4 CREAM TOPICAL at 08:22

## 2020-05-25 RX ADMIN — Medication 1 DROP(S): at 05:25

## 2020-05-25 RX ADMIN — Medication 10 MILLIEQUIVALENT(S): at 05:26

## 2020-05-25 RX ADMIN — LIDOCAINE 1 PATCH: 4 CREAM TOPICAL at 17:16

## 2020-05-25 RX ADMIN — TRAMADOL HYDROCHLORIDE 50 MILLIGRAM(S): 50 TABLET ORAL at 10:57

## 2020-05-25 RX ADMIN — Medication 10 MILLIEQUIVALENT(S): at 17:11

## 2020-05-25 RX ADMIN — Medication 100 MICROGRAM(S): at 05:26

## 2020-05-25 RX ADMIN — LIDOCAINE 1 PATCH: 4 CREAM TOPICAL at 05:25

## 2020-05-25 RX ADMIN — GABAPENTIN 200 MILLIGRAM(S): 400 CAPSULE ORAL at 05:26

## 2020-05-25 RX ADMIN — TRAMADOL HYDROCHLORIDE 50 MILLIGRAM(S): 50 TABLET ORAL at 20:20

## 2020-05-25 NOTE — PROGRESS NOTE ADULT - SUBJECTIVE AND OBJECTIVE BOX
No overnight events.  Slept well.  Working with OT and discussed her left UE arm.  Patient having significant pain with PROM/AROM.  Provided encouragement.   Optimize medications to optimize performance.     REVIEW OF SYSTEMS  Constitutional - No fever,  +fatigue  Neurological - No headaches, +loss of strength  Musculoskeletal - +joint pain, No joint swelling, +muscle pain      VITALS  T(C): 36.7 (05-24-20 @ 20:57), Max: 36.8 (05-24-20 @ 09:04)  HR: 74 (05-24-20 @ 20:57) (71 - 74)  BP: 145/83 (05-24-20 @ 20:57) (144/82 - 145/83)  RR: 15 (05-24-20 @ 20:57) (15 - 16)  SpO2: 93% (05-24-20 @ 20:57) (92% - 93%)  Wt(kg): --        MEDICATIONS   acetaminophen   Tablet .. 650 milliGRAM(s) every 6 hours PRN  acetaminophen 325 mG/butalbital 50 mG/caffeine 40 mG 1 Tablet(s) <User Schedule>  ascorbic acid 500 milliGRAM(s) two times a day  aspirin enteric coated 81 milliGRAM(s) daily  atorvastatin 40 milliGRAM(s) at bedtime  cholecalciferol 1000 Unit(s) daily  dicyclomine 20 milliGRAM(s) three times a day before meals  enoxaparin Injectable 40 milliGRAM(s) daily  escitalopram 20 milliGRAM(s) daily  gabapentin 200 milliGRAM(s) three times a day  latanoprost 0.005% Ophthalmic Solution 1 Drop(s) at bedtime  levothyroxine 100 MICROGram(s) daily  lidocaine   Patch 1 Patch <User Schedule>  loperamide 2 milliGRAM(s) every 6 hours PRN  melatonin 6 milliGRAM(s) <User Schedule>  multivitamin 1 Tablet(s) daily  ondansetron   Disintegrating Tablet 4 milliGRAM(s) every 6 hours PRN  pantoprazole    Tablet 40 milliGRAM(s) before breakfast  potassium chloride    Tablet ER 10 milliEquivalent(s) two times a day  rifAXIMin 550 milliGRAM(s) <User Schedule>  senna 2 Tablet(s) at bedtime PRN  timolol 0.25% Solution 1 Drop(s) two times a day  traMADol 50 milliGRAM(s) every 4 hours PRN      RECENT LABS/IMAGING - Independently Reviewed      ---------  PHYSICAL EXAM  Constitutional - NAD, Comfortable  Pulm - Breathing comfortably, No wheezing  Abd - Soft   Extremities - No edema   Neurologic Exam -                    Cognitive - Awake, Alert     Motor - Left UE weakness due to pain  Psychiatric - Mood anxious    ASSESSMENT/PLAN  80y Female with functional deficits after multiple trauma after a fall  CAD - ASA, Lipitor  DM2 - ISS  Sleep - Melatonin  Mood -  Lexapro  Pain - Tylenol 1g Q6AM (prior to therapy) & PRN , Lidoderms to left shoulder, Neurontin 300mg TID (increased from BID 5/25), Tramadol  DVT PPX - SCD, Lovenox    Continue 3hrs a day of comprehensive rehab program.

## 2020-05-26 LAB
ALBUMIN SERPL ELPH-MCNC: 2.7 G/DL — LOW (ref 3.3–5)
ALP SERPL-CCNC: 150 U/L — HIGH (ref 40–120)
ALT FLD-CCNC: 18 U/L — SIGNIFICANT CHANGE UP (ref 10–45)
ANION GAP SERPL CALC-SCNC: 8 MMOL/L — SIGNIFICANT CHANGE UP (ref 5–17)
AST SERPL-CCNC: 23 U/L — SIGNIFICANT CHANGE UP (ref 10–40)
BILIRUB SERPL-MCNC: 0.6 MG/DL — SIGNIFICANT CHANGE UP (ref 0.2–1.2)
BUN SERPL-MCNC: 10 MG/DL — SIGNIFICANT CHANGE UP (ref 7–23)
CALCIUM SERPL-MCNC: 8.4 MG/DL — SIGNIFICANT CHANGE UP (ref 8.4–10.5)
CHLORIDE SERPL-SCNC: 102 MMOL/L — SIGNIFICANT CHANGE UP (ref 96–108)
CO2 SERPL-SCNC: 29 MMOL/L — SIGNIFICANT CHANGE UP (ref 22–31)
CREAT SERPL-MCNC: 0.52 MG/DL — SIGNIFICANT CHANGE UP (ref 0.5–1.3)
GLUCOSE SERPL-MCNC: 97 MG/DL — SIGNIFICANT CHANGE UP (ref 70–99)
HCT VFR BLD CALC: 34 % — LOW (ref 34.5–45)
HGB BLD-MCNC: 11 G/DL — LOW (ref 11.5–15.5)
MCHC RBC-ENTMCNC: 31.7 PG — SIGNIFICANT CHANGE UP (ref 27–34)
MCHC RBC-ENTMCNC: 32.4 GM/DL — SIGNIFICANT CHANGE UP (ref 32–36)
MCV RBC AUTO: 98 FL — SIGNIFICANT CHANGE UP (ref 80–100)
NRBC # BLD: 0 /100 WBCS — SIGNIFICANT CHANGE UP (ref 0–0)
PLATELET # BLD AUTO: 209 K/UL — SIGNIFICANT CHANGE UP (ref 150–400)
POTASSIUM SERPL-MCNC: 3.8 MMOL/L — SIGNIFICANT CHANGE UP (ref 3.5–5.3)
POTASSIUM SERPL-SCNC: 3.8 MMOL/L — SIGNIFICANT CHANGE UP (ref 3.5–5.3)
PROT SERPL-MCNC: 6 G/DL — SIGNIFICANT CHANGE UP (ref 6–8.3)
RBC # BLD: 3.47 M/UL — LOW (ref 3.8–5.2)
RBC # FLD: 15.3 % — HIGH (ref 10.3–14.5)
SODIUM SERPL-SCNC: 139 MMOL/L — SIGNIFICANT CHANGE UP (ref 135–145)
WBC # BLD: 6.8 K/UL — SIGNIFICANT CHANGE UP (ref 3.8–10.5)
WBC # FLD AUTO: 6.8 K/UL — SIGNIFICANT CHANGE UP (ref 3.8–10.5)

## 2020-05-26 PROCEDURE — 99232 SBSQ HOSP IP/OBS MODERATE 35: CPT

## 2020-05-26 RX ORDER — TRAMADOL HYDROCHLORIDE 50 MG/1
50 TABLET ORAL EVERY 4 HOURS
Refills: 0 | Status: DISCONTINUED | OUTPATIENT
Start: 2020-05-26 | End: 2020-05-30

## 2020-05-26 RX ADMIN — Medication 1 TABLET(S): at 11:30

## 2020-05-26 RX ADMIN — GABAPENTIN 300 MILLIGRAM(S): 400 CAPSULE ORAL at 20:38

## 2020-05-26 RX ADMIN — LIDOCAINE 2 PATCH: 4 CREAM TOPICAL at 08:49

## 2020-05-26 RX ADMIN — LIDOCAINE 2 PATCH: 4 CREAM TOPICAL at 05:11

## 2020-05-26 RX ADMIN — Medication 1 TABLET(S): at 08:49

## 2020-05-26 RX ADMIN — ATORVASTATIN CALCIUM 40 MILLIGRAM(S): 80 TABLET, FILM COATED ORAL at 20:38

## 2020-05-26 RX ADMIN — Medication 1000 UNIT(S): at 11:30

## 2020-05-26 RX ADMIN — ESCITALOPRAM OXALATE 20 MILLIGRAM(S): 10 TABLET, FILM COATED ORAL at 11:30

## 2020-05-26 RX ADMIN — TRAMADOL HYDROCHLORIDE 50 MILLIGRAM(S): 50 TABLET ORAL at 18:23

## 2020-05-26 RX ADMIN — Medication 10 MILLIEQUIVALENT(S): at 05:10

## 2020-05-26 RX ADMIN — GABAPENTIN 300 MILLIGRAM(S): 400 CAPSULE ORAL at 12:23

## 2020-05-26 RX ADMIN — ENOXAPARIN SODIUM 40 MILLIGRAM(S): 100 INJECTION SUBCUTANEOUS at 11:30

## 2020-05-26 RX ADMIN — Medication 81 MILLIGRAM(S): at 11:29

## 2020-05-26 RX ADMIN — Medication 1 DROP(S): at 05:11

## 2020-05-26 RX ADMIN — Medication 975 MILLIGRAM(S): at 05:10

## 2020-05-26 RX ADMIN — Medication 20 MILLIGRAM(S): at 05:10

## 2020-05-26 RX ADMIN — Medication 100 MICROGRAM(S): at 05:10

## 2020-05-26 RX ADMIN — LIDOCAINE 2 PATCH: 4 CREAM TOPICAL at 17:24

## 2020-05-26 RX ADMIN — Medication 10 MILLIEQUIVALENT(S): at 17:19

## 2020-05-26 RX ADMIN — Medication 500 MILLIGRAM(S): at 05:12

## 2020-05-26 RX ADMIN — Medication 20 MILLIGRAM(S): at 17:20

## 2020-05-26 RX ADMIN — PANTOPRAZOLE SODIUM 40 MILLIGRAM(S): 20 TABLET, DELAYED RELEASE ORAL at 05:10

## 2020-05-26 RX ADMIN — Medication 1 DROP(S): at 17:24

## 2020-05-26 RX ADMIN — GABAPENTIN 300 MILLIGRAM(S): 400 CAPSULE ORAL at 05:10

## 2020-05-26 RX ADMIN — LATANOPROST 1 DROP(S): 0.05 SOLUTION/ DROPS OPHTHALMIC; TOPICAL at 20:38

## 2020-05-26 RX ADMIN — Medication 6 MILLIGRAM(S): at 20:38

## 2020-05-26 RX ADMIN — Medication 20 MILLIGRAM(S): at 11:30

## 2020-05-26 RX ADMIN — Medication 500 MILLIGRAM(S): at 17:19

## 2020-05-26 NOTE — CHART NOTE - NSCHARTNOTEFT_GEN_A_CORE
IDT meeting 5/26/2020:     OT: min A lower body ADLs   mod A toileting and toilet transfers, tub bench transfers   goals: mod I     PT: min A transfers and bed mobility   CG to CS 75Ft ambulation   4 steps with 1 rail CG to min A (difficulty seeing steps 2/2 neck brace)   barriers: pain (although improving), stairs, brace    Tentative d/c plan: d/c home with significant other and home services on Saturday 5/30  Plan for family training this week

## 2020-05-26 NOTE — PROGRESS NOTE ADULT - ASSESSMENT
81 y/o woman with PMHx of CAD s/p stenting, HLD, thyroid CA s/p thyroidectomy, migraines who had a fall down a flight of stairs resulting in a C2 anterior wedge fracture, T9 vertebral fx (age indeterminate), and left humeral fx who now has functional deficits secondary to these injuries consisting of gait and ADL impairments as well as dysphagia.     Rehab: history of multitrauma  - Pt/OT [er primary team     GI: Dysphagia, anemia, IBS  - Reg consistency with Thins  - H&H stable   - thrombocytopenia improved   - C/w Rifamximin     CNS: Migraines   -C/w Fioricet qAM    DVT Prophylaxis: Lovenox, IPCs

## 2020-05-26 NOTE — PROGRESS NOTE ADULT - SUBJECTIVE AND OBJECTIVE BOX
Hospitalist: Kevin Palafox DO    CHIEF COMPLAINT: Patient is a 80y old  female who presents with a chief complaint of mutitrauma with c2 anterior wedge fx, t9 fx and left humeral fx (26 May 2020 11:36)    SUBJECTIVE / OVERNIGHT EVENTS: Patient seen and examined. No acute events overnight. Pain well controlled and patient without any complaints.    MEDICATIONS  (STANDING):  acetaminophen   Tablet .. 975 milliGRAM(s) Oral <User Schedule>  acetaminophen 325 mG/butalbital 50 mG/caffeine 40 mG 1 Tablet(s) Oral <User Schedule>  ascorbic acid 500 milliGRAM(s) Oral two times a day  aspirin enteric coated 81 milliGRAM(s) Oral daily  atorvastatin 40 milliGRAM(s) Oral at bedtime  cholecalciferol 1000 Unit(s) Oral daily  dicyclomine 20 milliGRAM(s) Oral three times a day before meals  enoxaparin Injectable 40 milliGRAM(s) SubCutaneous daily  escitalopram 20 milliGRAM(s) Oral daily  gabapentin 300 milliGRAM(s) Oral three times a day  latanoprost 0.005% Ophthalmic Solution 1 Drop(s) Both EYES at bedtime  levothyroxine 100 MICROGram(s) Oral daily  lidocaine   Patch 2 Patch Transdermal <User Schedule>  melatonin 6 milliGRAM(s) Oral <User Schedule>  multivitamin 1 Tablet(s) Oral daily  pantoprazole    Tablet 40 milliGRAM(s) Oral before breakfast  potassium chloride    Tablet ER 10 milliEquivalent(s) Oral two times a day  rifAXIMin 550 milliGRAM(s) Oral <User Schedule>  timolol 0.25% Solution 1 Drop(s) Both EYES two times a day    MEDICATIONS  (PRN):  acetaminophen   Tablet .. 650 milliGRAM(s) Oral every 6 hours PRN Mild Pain (1 - 3)  loperamide 2 milliGRAM(s) Oral every 6 hours PRN abdominal cramping from IBS  ondansetron   Disintegrating Tablet 4 milliGRAM(s) Oral every 6 hours PRN Nausea and/or Vomiting  senna 2 Tablet(s) Oral at bedtime PRN Constipation  traMADol 50 milliGRAM(s) Oral every 4 hours PRN Moderate Pain (4 - 6)      VITALS:  T(F): 98.4 (05-26-20 @ 08:04), Max: 98.4 (05-26-20 @ 08:04)  HR: 73 (05-26-20 @ 08:04) (73 - 74)  BP: 114/71 (05-26-20 @ 08:04) (114/71 - 145/83)  RR: 16 (05-26-20 @ 08:04) (16 - 16)  SpO2: 93% (05-26-20 @ 08:04)      REVIEW OF SYSTEMS:  For ROV please refer back to H&P     PHYSICAL EXAM:  GENERAL: NAD  NECK: Supple  NERVOUS SYSTEM:  awake and alert  HEART: S1s2 NL , RRR  CHEST/LUNG: Clear to percussion bilaterally  ABDOMEN: Soft, Nontender, Nondistended; Bowel sounds present  EXTREMITIES:  No edema    LABS:              11.0                 139  | 29   | 10           6.80  >-----------< 209     ------------------------< 97                    34.0                 3.8  | 102  | 0.52                                         Ca 8.4   Mg x     Ph x           TPro  6.0  /  Alb  2.7      TBili  0.6  /  DBili  x         AST  23  /  ALT  18            AlkPhos  150      RADIOLOGY & ADDITIONAL TESTS:    Imaging Personally Reviewed:    [X] Consultant(s) Notes Reviewed:  [X] Care Discussed with Consultants/Other Providers:

## 2020-05-26 NOTE — PROGRESS NOTE ADULT - SUBJECTIVE AND OBJECTIVE BOX
HPI:  This is a 81 y/o female who presented to the ED at [facility] after falling down a flight of stairs. Patient reports that she was getting up to use the restroom and while reaching for a light at the top of the stairs, lost her balance and fell down the stairs. In the ED, she had complaints of right chest wall pain, right elbow pain, and left shoulder pain. CTH was negative, CT of the spine showed corner fx of the anterior aspect of the base of C2. CT chest showed many chronic rib fractures and a T 9 compression fx (indeterminate age). CT of the abdomen showed subcutaneous edema/hemorrhage of the right flank. Xray of the left shoulder showed a comminuted fx of the proximal humerus. Othropedics were consulted and determined no surgical intervention was needed for the left humerus fx was needed. Recommended that patient is to remain NWB to left arm at all times. Neurosurgery was also consulted and determined that no surgical intervention was needed for C2 fx. Recommended that a [place] J collar be worn when OOB only.   Swallow eval done on May 15. Found patient to have moderate dysphagia, recommended nectar thick liquids with mechanical soft diet. Recommend small sips/bites.     Patient deemed medically stable for transfer to acute IPR at [facility] on 5/19/2020. (19 May 2020 14:57)    ROS- feels better- no further hallucinations, +BM yesterday, pain meds increased during ROM ex with OT over weekend    Vital Signs Last 24 Hrs  T(C): 36.9 (26 May 2020 08:04), Max: 36.9 (26 May 2020 08:04)  T(F): 98.4 (26 May 2020 08:04), Max: 98.4 (26 May 2020 08:04)  HR: 73 (26 May 2020 08:04) (73 - 74)  BP: 114/71 (26 May 2020 08:04) (114/71 - 145/83)  BP(mean): --  RR: 16 (26 May 2020 08:04) (16 - 16)  SpO2: 93% (26 May 2020 08:04) (93% - 94%)    Physical Exam  GENERAL Exam:     Nontoxic , No Acute Distress  with [place] J on  	HEENT:  normocephalic, atraumatic	  	LUNGS:	Clear bilaterally    	HEART:	 Normal S1S2   No murmur RRR        	GI/ ABDOMEN:  Soft,  +BS, tender to palpation  	EXTREMITIES:   edema LUE less  left upper extremity in sling  	MUSCULOSKELETAL: as above    	SKIN:      + Ecchymosis right elbow, partial skin loss with flap intact to right elbow. traumatic partial thickness skin loss over left shin,   No signs of skin breakdown or pressure wounds  RUE/BLES with FROM MS 5/5  LUE with Good ROM elbow>distally with MS 5/5 L shoulder not Tested  Sensation Intact to lt Touch throughout    IDT meeting 5/26/2020:     OT: min A lower body ADLs   mod A toileting and toilet transfers, tub bench transfers   goals: mod I     PT: min A transfers and bed mobility   CG to CS 75Ft ambulation   4 steps with 1 rail CG to min A (difficulty seeing steps 2/2 neck brace)   barriers: pain (although improving), stairs, brace    Tentative d/c plan: d/c home with significant other and home services on Saturday 5/30  Plan for family training this week.               labs                                     11.0   6.80  )-----------( 209      ( 26 May 2020 05:43 )             34.0   05-26    139  |  102  |  10  ----------------------------<  97  3.8   |  29  |  0.52    Ca    8.4      26 May 2020 05:43    TPro  6.0  /  Alb  2.7<L>  /  TBili  0.6  /  DBili  x   /  AST  23  /  ALT  18  /  AlkPhos  150<H>  05-26

## 2020-05-26 NOTE — PROGRESS NOTE ADULT - ASSESSMENT
This is a 79 y/o female with PMHx of CAD s/p stenting, HLD, thyroid CA s/p thyroidectomy, migraines who had a fall down a flight of stairs resulting in a C2 anterior wedge fracture, T9 vertebral fx (age indeterminate), and left humeral fx who now has functional deficits secondary to these injuries consisting of gait and ADL impairments as well as dysphagia.     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

## 2020-05-27 PROCEDURE — 99233 SBSQ HOSP IP/OBS HIGH 50: CPT

## 2020-05-27 RX ADMIN — Medication 6 MILLIGRAM(S): at 21:12

## 2020-05-27 RX ADMIN — PANTOPRAZOLE SODIUM 40 MILLIGRAM(S): 20 TABLET, DELAYED RELEASE ORAL at 05:59

## 2020-05-27 RX ADMIN — TRAMADOL HYDROCHLORIDE 50 MILLIGRAM(S): 50 TABLET ORAL at 21:13

## 2020-05-27 RX ADMIN — LIDOCAINE 2 PATCH: 4 CREAM TOPICAL at 08:40

## 2020-05-27 RX ADMIN — ATORVASTATIN CALCIUM 40 MILLIGRAM(S): 80 TABLET, FILM COATED ORAL at 21:12

## 2020-05-27 RX ADMIN — Medication 1 TABLET(S): at 12:30

## 2020-05-27 RX ADMIN — Medication 81 MILLIGRAM(S): at 12:30

## 2020-05-27 RX ADMIN — LIDOCAINE 2 PATCH: 4 CREAM TOPICAL at 17:28

## 2020-05-27 RX ADMIN — Medication 20 MILLIGRAM(S): at 12:31

## 2020-05-27 RX ADMIN — Medication 10 MILLIEQUIVALENT(S): at 17:27

## 2020-05-27 RX ADMIN — Medication 1 DROP(S): at 17:28

## 2020-05-27 RX ADMIN — TRAMADOL HYDROCHLORIDE 50 MILLIGRAM(S): 50 TABLET ORAL at 17:28

## 2020-05-27 RX ADMIN — Medication 20 MILLIGRAM(S): at 05:59

## 2020-05-27 RX ADMIN — LATANOPROST 1 DROP(S): 0.05 SOLUTION/ DROPS OPHTHALMIC; TOPICAL at 21:13

## 2020-05-27 RX ADMIN — GABAPENTIN 300 MILLIGRAM(S): 400 CAPSULE ORAL at 12:35

## 2020-05-27 RX ADMIN — ESCITALOPRAM OXALATE 20 MILLIGRAM(S): 10 TABLET, FILM COATED ORAL at 12:30

## 2020-05-27 RX ADMIN — TRAMADOL HYDROCHLORIDE 50 MILLIGRAM(S): 50 TABLET ORAL at 12:31

## 2020-05-27 RX ADMIN — TRAMADOL HYDROCHLORIDE 50 MILLIGRAM(S): 50 TABLET ORAL at 08:40

## 2020-05-27 RX ADMIN — Medication 10 MILLIEQUIVALENT(S): at 05:59

## 2020-05-27 RX ADMIN — SENNA PLUS 2 TABLET(S): 8.6 TABLET ORAL at 21:12

## 2020-05-27 RX ADMIN — GABAPENTIN 300 MILLIGRAM(S): 400 CAPSULE ORAL at 21:12

## 2020-05-27 RX ADMIN — Medication 975 MILLIGRAM(S): at 05:59

## 2020-05-27 RX ADMIN — Medication 500 MILLIGRAM(S): at 05:59

## 2020-05-27 RX ADMIN — LIDOCAINE 2 PATCH: 4 CREAM TOPICAL at 06:00

## 2020-05-27 RX ADMIN — Medication 100 MICROGRAM(S): at 05:59

## 2020-05-27 RX ADMIN — Medication 20 MILLIGRAM(S): at 18:28

## 2020-05-27 RX ADMIN — GABAPENTIN 300 MILLIGRAM(S): 400 CAPSULE ORAL at 05:59

## 2020-05-27 RX ADMIN — Medication 1000 UNIT(S): at 12:30

## 2020-05-27 RX ADMIN — Medication 1 DROP(S): at 05:59

## 2020-05-27 RX ADMIN — Medication 650 MILLIGRAM(S): at 02:40

## 2020-05-27 RX ADMIN — Medication 1 TABLET(S): at 08:40

## 2020-05-27 RX ADMIN — ONDANSETRON 4 MILLIGRAM(S): 8 TABLET, FILM COATED ORAL at 05:58

## 2020-05-27 RX ADMIN — ENOXAPARIN SODIUM 40 MILLIGRAM(S): 100 INJECTION SUBCUTANEOUS at 12:30

## 2020-05-27 RX ADMIN — Medication 500 MILLIGRAM(S): at 17:27

## 2020-05-27 NOTE — PROGRESS NOTE ADULT - ASSESSMENT
This is a 81 y/o female with PMHx of CAD s/p stenting, HLD, thyroid CA s/p thyroidectomy, migraines who had a fall down a flight of stairs resulting in a C2 anterior wedge fracture, T9 vertebral fx (age indeterminate), and left humeral fx who now has functional deficits secondary to these injuries consisting of gait and ADL impairments as well as dysphagia.     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

## 2020-05-27 NOTE — CHART NOTE - NSCHARTNOTEFT_GEN_A_CORE
Nutrition Follow Up Note  Hospital Course   (Per Electronic Medical Record):     Source:  Patient [X]  Medical Record [X]      Diet:   Low Sodium Diet w/ Thin Liquids  Tolerates Diet Well  No Chewing/Swallowing Difficulties  No Recent Vomiting, Diarrhea or Constipation & Some Recent Nausea  Consumes 50-75% of Meals (as Per Documentation)  Declines Nutrition Supplementation  Obtained Food Preferences from Patient  Education Provided on Proper Nutrition    Enteral/Parenteral Nutrition: Not Applicable    Current Weight: 150.5lb on 5/19  Obtain New Weight  Obtain Weights Weekly     Pertinent Medications: MEDICATIONS  (STANDING):  acetaminophen   Tablet .. 975 milliGRAM(s) Oral <User Schedule>  acetaminophen 325 mG/butalbital 50 mG/caffeine 40 mG 1 Tablet(s) Oral <User Schedule>  ascorbic acid 500 milliGRAM(s) Oral two times a day  aspirin enteric coated 81 milliGRAM(s) Oral daily  atorvastatin 40 milliGRAM(s) Oral at bedtime  cholecalciferol 1000 Unit(s) Oral daily  dicyclomine 20 milliGRAM(s) Oral three times a day before meals  enoxaparin Injectable 40 milliGRAM(s) SubCutaneous daily  escitalopram 20 milliGRAM(s) Oral daily  gabapentin 300 milliGRAM(s) Oral three times a day  latanoprost 0.005% Ophthalmic Solution 1 Drop(s) Both EYES at bedtime  levothyroxine 100 MICROGram(s) Oral daily  lidocaine   Patch 2 Patch Transdermal <User Schedule>  melatonin 6 milliGRAM(s) Oral <User Schedule>  multivitamin 1 Tablet(s) Oral daily  pantoprazole    Tablet 40 milliGRAM(s) Oral before breakfast  potassium chloride    Tablet ER 10 milliEquivalent(s) Oral two times a day  rifAXIMin 550 milliGRAM(s) Oral <User Schedule>  timolol 0.25% Solution 1 Drop(s) Both EYES two times a day    MEDICATIONS  (PRN):  acetaminophen   Tablet .. 650 milliGRAM(s) Oral every 6 hours PRN Mild Pain (1 - 3)  loperamide 2 milliGRAM(s) Oral every 6 hours PRN abdominal cramping from IBS  ondansetron   Disintegrating Tablet 4 milliGRAM(s) Oral every 6 hours PRN Nausea and/or Vomiting  senna 2 Tablet(s) Oral at bedtime PRN Constipation  traMADol 50 milliGRAM(s) Oral every 4 hours PRN Moderate Pain (4 - 6)    Pertinent Labs:  05-26 Na139 mmol/L Glu 97 mg/dL K+ 3.8 mmol/L Cr  0.52 mg/dL BUN 10 mg/dL 05-26 Alb 2.7 g/dL<L>    Skin: No Pressure Ulcers     Edema: None Noted     Last Bowel Movement: on 5/25    Estimated Needs:   [X] No Change Since Previous Assessment    Previous Nutrition Diagnosis:   Moderate Malnutrition     Nutrition Diagnosis is [X] Ongoing - Education Provided on Proper Nutrition & Declines Nutrition Supplementation     New Nutrition Diagnosis: [X] Not Applicable    Interventions:   1. Education Provided on Proper Nutrition   2. Recommend Continue Nutrition Plan of Care     Monitoring & Evaluation:   [X] Weights   [X] PO Intake   [X] Skin Integrity   [X] Follow Up (Per Protocol)  [X] Tolerance to Diet Prescription   [X] Other: Labs     Registered Dietitian/Nutritionist Remains Available.  [place] RDN    Pager # [phone]  Phone# [phone]

## 2020-05-27 NOTE — PROGRESS NOTE ADULT - ASSESSMENT
81 y/o woman with PMHx of CAD s/p stenting, HLD, thyroid CA s/p thyroidectomy, migraines who had a fall down a flight of stairs resulting in a C2 anterior wedge fracture, T9 vertebral fx (age indeterminate), and left humeral fx who now has functional deficits secondary to these injuries consisting of gait and ADL impairments as well as dysphagia.     Rehab: history of multitrauma pain controlled   - Pt/OT [er primary team     GI: Dysphagia, anemia, IBS  - Reg consistency with Thins  - H&H stable   - thrombocytopenia improved   - C/w Rifamximin   aspiration precautiosn     CNS: Migraines in remission   -C/w Fioricet qAM    DVT Prophylaxis: Lovenox, IPCs     reviewed w patient and rehab team plan of care

## 2020-05-27 NOTE — PROGRESS NOTE ADULT - SUBJECTIVE AND OBJECTIVE BOX
HPI:  This is a 79 y/o female who presented to the ED at [facility] after falling down a flight of stairs. Patient reports that she was getting up to use the restroom and while reaching for a light at the top of the stairs, lost her balance and fell down the stairs. In the ED, she had complaints of right chest wall pain, right elbow pain, and left shoulder pain. CTH was negative, CT of the spine showed corner fx of the anterior aspect of the base of C2. CT chest showed many chronic rib fractures and a T 9 compression fx (indeterminate age). CT of the abdomen showed subcutaneous edema/hemorrhage of the right flank. Xray of the left shoulder showed a comminuted fx of the proximal humerus. Othropedics were consulted and determined no surgical intervention was needed for the left humerus fx was needed. Recommended that patient is to remain NWB to left arm at all times. Neurosurgery was also consulted and determined that no surgical intervention was needed for C2 fx. Recommended that a [place] J collar be worn when OOB only.   Swallow eval done on May 15. Found patient to have moderate dysphagia, recommended nectar thick liquids with mechanical soft diet. Recommend small sips/bites.     Patient deemed medically stable for transfer to acute IPR at [facility] on 5/19/2020. (19 May 2020 14:57)    ROS- feels better- no further hallucinations, +BM yesterday, increased pain L shoulder overnight    Vital Signs Last 24 Hrs  T(C): 36.8 (27 May 2020 08:01), Max: 36.8 (27 May 2020 08:01)  T(F): 98.2 (27 May 2020 08:01), Max: 98.2 (27 May 2020 08:01)  HR: 70 (27 May 2020 08:01) (70 - 85)  BP: 109/72 (27 May 2020 08:01) (109/72 - 117/68)  BP(mean): --  RR: 16 (27 May 2020 08:01) (16 - 16)  SpO2: 96% (27 May 2020 08:01) (94% - 96%)    Physical Exam  GENERAL Exam:     Nontoxic , No Acute Distress  with [place] J on  	HEENT:  normocephalic, atraumatic	  	LUNGS:	Clear bilaterally    	HEART:	 Normal S1S2   No murmur RRR        	GI/ ABDOMEN:  Soft,  +BS, tender to palpation  	EXTREMITIES:   edema LUE less +ecchymoses  left upper extremity in sling  	MUSCULOSKELETAL: as above    	SKIN:      + Ecchymosis right elbow, partial skin loss with flap intact to right elbow. traumatic partial thickness skin loss over left shin,   No signs of skin breakdown or pressure wounds  RUE/BLES with FROM MS 5/5  LUE with Good ROM elbow>distally with MS 5/5 L shoulder not Tested  Sensation Intact to lt Touch throughout    IDT meeting 5/26/2020:     OT: min A lower body ADLs   mod A toileting and toilet transfers, tub bench transfers   goals: mod I     PT: min A transfers and bed mobility   CG to CS 75Ft ambulation   4 steps with 1 rail CG to min A (difficulty seeing steps 2/2 neck brace)   barriers: pain (although improving), stairs, brace    Tentative d/c plan: d/c home with significant other and home services on Saturday 5/30  Plan for family training this week.               labs                                     11.0   6.80  )-----------( 209      ( 26 May 2020 05:43 )             34.0   05-26    139  |  102  |  10  ----------------------------<  97  3.8   |  29  |  0.52    Ca    8.4      26 May 2020 05:43    TPro  6.0  /  Alb  2.7<L>  /  TBili  0.6  /  DBili  x   /  AST  23  /  ALT  18  /  AlkPhos  150<H>  05-26

## 2020-05-27 NOTE — PROGRESS NOTE ADULT - SUBJECTIVE AND OBJECTIVE BOX
seen and examined    no co     ros all others are neg     Vital Signs Last 24 Hrs  T(C): 36.8 (27 May 2020 08:01), Max: 36.8 (27 May 2020 08:01)  T(F): 98.2 (27 May 2020 08:01), Max: 98.2 (27 May 2020 08:01)  HR: 70 (27 May 2020 08:01) (70 - 85)  BP: 109/72 (27 May 2020 08:01) (109/72 - 117/68)  BP(mean): --  RR: 16 (27 May 2020 08:01) (16 - 16)  SpO2: 96% (27 May 2020 08:01) (94% - 96%)                          11.0   6.80  )-----------( 209      ( 26 May 2020 05:43 )             34.0   05-26    139  |  102  |  10  ----------------------------<  97  3.8   |  29  |  0.52    Ca    8.4      26 May 2020 05:43    TPro  6.0  /  Alb  2.7<L>  /  TBili  0.6  /  DBili  x   /  AST  23  /  ALT  18  /  AlkPhos  150<H>  05-26    MEDICATIONS  (STANDING):  acetaminophen   Tablet .. 975 milliGRAM(s) Oral <User Schedule>  acetaminophen 325 mG/butalbital 50 mG/caffeine 40 mG 1 Tablet(s) Oral <User Schedule>  ascorbic acid 500 milliGRAM(s) Oral two times a day  aspirin enteric coated 81 milliGRAM(s) Oral daily  atorvastatin 40 milliGRAM(s) Oral at bedtime  cholecalciferol 1000 Unit(s) Oral daily  dicyclomine 20 milliGRAM(s) Oral three times a day before meals  enoxaparin Injectable 40 milliGRAM(s) SubCutaneous daily  escitalopram 20 milliGRAM(s) Oral daily  gabapentin 300 milliGRAM(s) Oral three times a day  latanoprost 0.005% Ophthalmic Solution 1 Drop(s) Both EYES at bedtime  levothyroxine 100 MICROGram(s) Oral daily  lidocaine   Patch 2 Patch Transdermal <User Schedule>  melatonin 6 milliGRAM(s) Oral <User Schedule>  multivitamin 1 Tablet(s) Oral daily  pantoprazole    Tablet 40 milliGRAM(s) Oral before breakfast  potassium chloride    Tablet ER 10 milliEquivalent(s) Oral two times a day  rifAXIMin 550 milliGRAM(s) Oral <User Schedule>  timolol 0.25% Solution 1 Drop(s) Both EYES two times a day    MEDICATIONS  (PRN):  acetaminophen   Tablet .. 650 milliGRAM(s) Oral every 6 hours PRN Mild Pain (1 - 3)  loperamide 2 milliGRAM(s) Oral every 6 hours PRN abdominal cramping from IBS  ondansetron   Disintegrating Tablet 4 milliGRAM(s) Oral every 6 hours PRN Nausea and/or Vomiting  senna 2 Tablet(s) Oral at bedtime PRN Constipation  traMADol 50 milliGRAM(s) Oral every 4 hours PRN Moderate Pain (4 - 6)

## 2020-05-28 LAB — SARS-COV-2 RNA SPEC QL NAA+PROBE: SIGNIFICANT CHANGE UP

## 2020-05-28 PROCEDURE — 99233 SBSQ HOSP IP/OBS HIGH 50: CPT

## 2020-05-28 RX ADMIN — Medication 81 MILLIGRAM(S): at 12:00

## 2020-05-28 RX ADMIN — Medication 1000 UNIT(S): at 12:00

## 2020-05-28 RX ADMIN — Medication 20 MILLIGRAM(S): at 06:53

## 2020-05-28 RX ADMIN — LIDOCAINE 2 PATCH: 4 CREAM TOPICAL at 06:53

## 2020-05-28 RX ADMIN — Medication 500 MILLIGRAM(S): at 05:24

## 2020-05-28 RX ADMIN — Medication 20 MILLIGRAM(S): at 17:25

## 2020-05-28 RX ADMIN — TRAMADOL HYDROCHLORIDE 50 MILLIGRAM(S): 50 TABLET ORAL at 21:12

## 2020-05-28 RX ADMIN — TRAMADOL HYDROCHLORIDE 50 MILLIGRAM(S): 50 TABLET ORAL at 12:04

## 2020-05-28 RX ADMIN — Medication 500 MILLIGRAM(S): at 17:25

## 2020-05-28 RX ADMIN — ENOXAPARIN SODIUM 40 MILLIGRAM(S): 100 INJECTION SUBCUTANEOUS at 12:00

## 2020-05-28 RX ADMIN — LATANOPROST 1 DROP(S): 0.05 SOLUTION/ DROPS OPHTHALMIC; TOPICAL at 21:11

## 2020-05-28 RX ADMIN — Medication 975 MILLIGRAM(S): at 05:25

## 2020-05-28 RX ADMIN — Medication 1 TABLET(S): at 07:42

## 2020-05-28 RX ADMIN — SENNA PLUS 2 TABLET(S): 8.6 TABLET ORAL at 21:12

## 2020-05-28 RX ADMIN — Medication 1 DROP(S): at 17:26

## 2020-05-28 RX ADMIN — Medication 1 TABLET(S): at 12:00

## 2020-05-28 RX ADMIN — Medication 1 DROP(S): at 05:26

## 2020-05-28 RX ADMIN — ATORVASTATIN CALCIUM 40 MILLIGRAM(S): 80 TABLET, FILM COATED ORAL at 21:11

## 2020-05-28 RX ADMIN — Medication 6 MILLIGRAM(S): at 21:11

## 2020-05-28 RX ADMIN — GABAPENTIN 300 MILLIGRAM(S): 400 CAPSULE ORAL at 21:11

## 2020-05-28 RX ADMIN — Medication 100 MICROGRAM(S): at 05:26

## 2020-05-28 RX ADMIN — Medication 20 MILLIGRAM(S): at 12:01

## 2020-05-28 RX ADMIN — ESCITALOPRAM OXALATE 20 MILLIGRAM(S): 10 TABLET, FILM COATED ORAL at 12:00

## 2020-05-28 RX ADMIN — LIDOCAINE 2 PATCH: 4 CREAM TOPICAL at 17:26

## 2020-05-28 RX ADMIN — GABAPENTIN 300 MILLIGRAM(S): 400 CAPSULE ORAL at 05:26

## 2020-05-28 RX ADMIN — Medication 10 MILLIEQUIVALENT(S): at 05:25

## 2020-05-28 RX ADMIN — PANTOPRAZOLE SODIUM 40 MILLIGRAM(S): 20 TABLET, DELAYED RELEASE ORAL at 06:53

## 2020-05-28 RX ADMIN — GABAPENTIN 300 MILLIGRAM(S): 400 CAPSULE ORAL at 12:03

## 2020-05-28 RX ADMIN — LIDOCAINE 2 PATCH: 4 CREAM TOPICAL at 07:28

## 2020-05-28 RX ADMIN — Medication 10 MILLIEQUIVALENT(S): at 17:25

## 2020-05-28 NOTE — PROGRESS NOTE ADULT - ASSESSMENT
This is a 81 y/o female with PMHx of CAD s/p stenting, HLD, thyroid CA s/p thyroidectomy, migraines who had a fall down a flight of stairs resulting in a C2 anterior wedge fracture, T9 vertebral fx (age indeterminate), and left humeral fx who now has functional deficits secondary to these injuries consisting of gait and ADL impairments as well as dysphagia.     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

## 2020-05-28 NOTE — PROGRESS NOTE ADULT - SUBJECTIVE AND OBJECTIVE BOX
HPI:  This is a 79 y/o female who presented to the ED at [facility] after falling down a flight of stairs. Patient reports that she was getting up to use the restroom and while reaching for a light at the top of the stairs, lost her balance and fell down the stairs. In the ED, she had complaints of right chest wall pain, right elbow pain, and left shoulder pain. CTH was negative, CT of the spine showed corner fx of the anterior aspect of the base of C2. CT chest showed many chronic rib fractures and a T 9 compression fx (indeterminate age). CT of the abdomen showed subcutaneous edema/hemorrhage of the right flank. Xray of the left shoulder showed a comminuted fx of the proximal humerus. Othropedics were consulted and determined no surgical intervention was needed for the left humerus fx was needed. Recommended that patient is to remain NWB to left arm at all times. Neurosurgery was also consulted and determined that no surgical intervention was needed for C2 fx. Recommended that a [place] J collar be worn when OOB only.   Swallow eval done on May 15. Found patient to have moderate dysphagia, recommended nectar thick liquids with mechanical soft diet. Recommend small sips/bites.     Patient deemed medically stable for transfer to acute IPR at [facility] on 5/19/2020. (19 May 2020 14:57)    ROS- pain L humerus better on Increased Tramadol    Vital Signs Last 24 Hrs  T(C): 36.6 (28 May 2020 08:35), Max: 37 (27 May 2020 20:44)  T(F): 97.8 (28 May 2020 08:35), Max: 98.6 (27 May 2020 20:44)  HR: 78 (28 May 2020 08:35) (75 - 83)  BP: 154/87 (28 May 2020 08:35) (125/77 - 154/87)  BP(mean): --  RR: 16 (28 May 2020 08:35) (16 - 17)  SpO2: 92% (28 May 2020 08:35) (92% - 94%)    Physical Exam  GENERAL Exam:     Nontoxic , No Acute Distress  with [place] J on  	HEENT:  normocephalic, atraumatic	  	LUNGS:	Clear bilaterally    	HEART:	 Normal S1S2   No murmur RRR        	GI/ ABDOMEN:  Soft,  +BS, tender to palpation  	EXTREMITIES:   edema LUE less +ecchymoses  left upper extremity in sling  	MUSCULOSKELETAL: as above    	SKIN:      + Ecchymosis right elbow, partial skin loss with flap intact to right elbow. traumatic partial thickness skin loss over left shin,   No signs of skin breakdown or pressure wounds  RUE/BLES with FROM MS 5/5  LUE with Good ROM elbow>distally with MS 5/5 L shoulder not Tested  Sensation Intact to lt Touch throughout    IDT meeting 5/26/2020:     OT: min A lower body ADLs   mod A toileting and toilet transfers, tub bench transfers   goals: mod I     PT: min A transfers and bed mobility   CG to  75Ft ambulation   4 steps with 1 rail CG to min A (difficulty seeing steps 2/2 neck brace)   barriers: pain (although improving), stairs, brace    Tentative d/c plan: d/c home with significant other and home services on Saturday 5/30  Plan for family training this week.               labs                                     11.0   6.80  )-----------( 209      ( 26 May 2020 05:43 )             34.0   05-26    139  |  102  |  10  ----------------------------<  97  3.8   |  29  |  0.52    Ca    8.4      26 May 2020 05:43    TPro  6.0  /  Alb  2.7<L>  /  TBili  0.6  /  DBili  x   /  AST  23  /  ALT  18  /  AlkPhos  150<H>  05-26

## 2020-05-28 NOTE — PROGRESS NOTE ADULT - SUBJECTIVE AND OBJECTIVE BOX
Inhales 1500   w incentive spirometry     mild aches and pains   ok     ROS all others are neg     Vital Signs Last 24 Hrs  T(C): 36.6 (28 May 2020 08:35), Max: 37 (27 May 2020 20:44)  T(F): 97.8 (28 May 2020 08:35), Max: 98.6 (27 May 2020 20:44)  HR: 78 (28 May 2020 08:35) (75 - 83)  BP: 154/87 (28 May 2020 08:35) (125/77 - 154/87)  BP(mean): --  RR: 16 (28 May 2020 08:35) (16 - 17)  SpO2: 92% (28 May 2020 08:35) (92% - 94%)    MEDICATIONS  (STANDING):  acetaminophen   Tablet .. 975 milliGRAM(s) Oral <User Schedule>  acetaminophen 325 mG/butalbital 50 mG/caffeine 40 mG 1 Tablet(s) Oral <User Schedule>  ascorbic acid 500 milliGRAM(s) Oral two times a day  aspirin enteric coated 81 milliGRAM(s) Oral daily  atorvastatin 40 milliGRAM(s) Oral at bedtime  cholecalciferol 1000 Unit(s) Oral daily  dicyclomine 20 milliGRAM(s) Oral three times a day before meals  enoxaparin Injectable 40 milliGRAM(s) SubCutaneous daily  escitalopram 20 milliGRAM(s) Oral daily  gabapentin 300 milliGRAM(s) Oral three times a day  latanoprost 0.005% Ophthalmic Solution 1 Drop(s) Both EYES at bedtime  levothyroxine 100 MICROGram(s) Oral daily  lidocaine   Patch 2 Patch Transdermal <User Schedule>  melatonin 6 milliGRAM(s) Oral <User Schedule>  multivitamin 1 Tablet(s) Oral daily  pantoprazole    Tablet 40 milliGRAM(s) Oral before breakfast  potassium chloride    Tablet ER 10 milliEquivalent(s) Oral two times a day  rifAXIMin 550 milliGRAM(s) Oral <User Schedule>  timolol 0.25% Solution 1 Drop(s) Both EYES two times a day    MEDICATIONS  (PRN):  acetaminophen   Tablet .. 650 milliGRAM(s) Oral every 6 hours PRN Mild Pain (1 - 3)  loperamide 2 milliGRAM(s) Oral every 6 hours PRN abdominal cramping from IBS  ondansetron   Disintegrating Tablet 4 milliGRAM(s) Oral every 6 hours PRN Nausea and/or Vomiting  senna 2 Tablet(s) Oral at bedtime PRN Constipation  traMADol 50 milliGRAM(s) Oral every 4 hours PRN Moderate Pain (4 - 6)

## 2020-05-29 ENCOUNTER — TRANSCRIPTION ENCOUNTER (OUTPATIENT)
Age: 80
End: 2020-05-29

## 2020-05-29 LAB
ALBUMIN SERPL ELPH-MCNC: 2.9 G/DL — LOW (ref 3.3–5)
ALP SERPL-CCNC: 187 U/L — HIGH (ref 40–120)
ALT FLD-CCNC: 18 U/L — SIGNIFICANT CHANGE UP (ref 10–45)
ANION GAP SERPL CALC-SCNC: 7 MMOL/L — SIGNIFICANT CHANGE UP (ref 5–17)
AST SERPL-CCNC: 19 U/L — SIGNIFICANT CHANGE UP (ref 10–40)
BILIRUB DIRECT SERPL-MCNC: 0.2 MG/DL — SIGNIFICANT CHANGE UP (ref 0–0.2)
BILIRUB INDIRECT FLD-MCNC: 0.3 MG/DL — SIGNIFICANT CHANGE UP (ref 0.2–1)
BILIRUB SERPL-MCNC: 0.5 MG/DL — SIGNIFICANT CHANGE UP (ref 0.2–1.2)
BUN SERPL-MCNC: 11 MG/DL — SIGNIFICANT CHANGE UP (ref 7–23)
CALCIUM SERPL-MCNC: 8.4 MG/DL — SIGNIFICANT CHANGE UP (ref 8.4–10.5)
CHLORIDE SERPL-SCNC: 104 MMOL/L — SIGNIFICANT CHANGE UP (ref 96–108)
CO2 SERPL-SCNC: 30 MMOL/L — SIGNIFICANT CHANGE UP (ref 22–31)
CREAT SERPL-MCNC: 0.62 MG/DL — SIGNIFICANT CHANGE UP (ref 0.5–1.3)
GLUCOSE SERPL-MCNC: 104 MG/DL — HIGH (ref 70–99)
HCT VFR BLD CALC: 34.7 % — SIGNIFICANT CHANGE UP (ref 34.5–45)
HGB BLD-MCNC: 11.1 G/DL — LOW (ref 11.5–15.5)
MCHC RBC-ENTMCNC: 31.3 PG — SIGNIFICANT CHANGE UP (ref 27–34)
MCHC RBC-ENTMCNC: 32 GM/DL — SIGNIFICANT CHANGE UP (ref 32–36)
MCV RBC AUTO: 97.7 FL — SIGNIFICANT CHANGE UP (ref 80–100)
NRBC # BLD: 0 /100 WBCS — SIGNIFICANT CHANGE UP (ref 0–0)
PLATELET # BLD AUTO: 203 K/UL — SIGNIFICANT CHANGE UP (ref 150–400)
POTASSIUM SERPL-MCNC: 3.8 MMOL/L — SIGNIFICANT CHANGE UP (ref 3.5–5.3)
POTASSIUM SERPL-SCNC: 3.8 MMOL/L — SIGNIFICANT CHANGE UP (ref 3.5–5.3)
PROT SERPL-MCNC: 6.3 G/DL — SIGNIFICANT CHANGE UP (ref 6–8.3)
RBC # BLD: 3.55 M/UL — LOW (ref 3.8–5.2)
RBC # FLD: 15.1 % — HIGH (ref 10.3–14.5)
SODIUM SERPL-SCNC: 141 MMOL/L — SIGNIFICANT CHANGE UP (ref 135–145)
WBC # BLD: 5.99 K/UL — SIGNIFICANT CHANGE UP (ref 3.8–10.5)
WBC # FLD AUTO: 5.99 K/UL — SIGNIFICANT CHANGE UP (ref 3.8–10.5)

## 2020-05-29 PROCEDURE — 99233 SBSQ HOSP IP/OBS HIGH 50: CPT

## 2020-05-29 RX ORDER — ASCORBIC ACID 60 MG
1 TABLET,CHEWABLE ORAL
Qty: 0 | Refills: 0 | DISCHARGE
Start: 2020-05-29

## 2020-05-29 RX ORDER — ATORVASTATIN CALCIUM 80 MG/1
1 TABLET, FILM COATED ORAL
Qty: 30 | Refills: 0
Start: 2020-05-29 | End: 2020-06-27

## 2020-05-29 RX ORDER — ACETAMINOPHEN 500 MG
2 TABLET ORAL
Qty: 0 | Refills: 0 | DISCHARGE
Start: 2020-05-29

## 2020-05-29 RX ORDER — PANTOPRAZOLE SODIUM 20 MG/1
1 TABLET, DELAYED RELEASE ORAL
Qty: 30 | Refills: 0
Start: 2020-05-29 | End: 2020-06-27

## 2020-05-29 RX ORDER — CHOLECALCIFEROL (VITAMIN D3) 125 MCG
1000 CAPSULE ORAL
Qty: 0 | Refills: 0 | DISCHARGE
Start: 2020-05-29

## 2020-05-29 RX ORDER — ASPIRIN/CALCIUM CARB/MAGNESIUM 324 MG
1 TABLET ORAL
Qty: 0 | Refills: 0 | DISCHARGE
Start: 2020-05-29

## 2020-05-29 RX ORDER — POTASSIUM CHLORIDE 20 MEQ
1 PACKET (EA) ORAL
Qty: 60 | Refills: 0
Start: 2020-05-29 | End: 2020-06-27

## 2020-05-29 RX ORDER — TRAMADOL HYDROCHLORIDE 50 MG/1
1 TABLET ORAL
Qty: 42 | Refills: 0
Start: 2020-05-29 | End: 2020-06-04

## 2020-05-29 RX ORDER — TIMOLOL 0.5 %
1 DROPS OPHTHALMIC (EYE)
Qty: 1 | Refills: 0
Start: 2020-05-29

## 2020-05-29 RX ORDER — LOPERAMIDE HCL 2 MG
1 TABLET ORAL
Qty: 0 | Refills: 0 | DISCHARGE
Start: 2020-05-29

## 2020-05-29 RX ORDER — LEVOTHYROXINE SODIUM 125 MCG
1 TABLET ORAL
Qty: 30 | Refills: 0
Start: 2020-05-29 | End: 2020-06-27

## 2020-05-29 RX ORDER — LATANOPROST 0.05 MG/ML
1 SOLUTION/ DROPS OPHTHALMIC; TOPICAL
Qty: 1 | Refills: 0
Start: 2020-05-29

## 2020-05-29 RX ORDER — ESCITALOPRAM OXALATE 10 MG/1
1 TABLET, FILM COATED ORAL
Qty: 30 | Refills: 0
Start: 2020-05-29 | End: 2020-06-27

## 2020-05-29 RX ORDER — LANOLIN ALCOHOL/MO/W.PET/CERES
2 CREAM (GRAM) TOPICAL
Qty: 60 | Refills: 0
Start: 2020-05-29 | End: 2020-06-27

## 2020-05-29 RX ORDER — ONDANSETRON 8 MG/1
1 TABLET, FILM COATED ORAL
Qty: 28 | Refills: 0
Start: 2020-05-29 | End: 2020-06-04

## 2020-05-29 RX ORDER — GABAPENTIN 400 MG/1
1 CAPSULE ORAL
Qty: 90 | Refills: 0
Start: 2020-05-29 | End: 2020-06-27

## 2020-05-29 RX ADMIN — Medication 100 MICROGRAM(S): at 05:28

## 2020-05-29 RX ADMIN — Medication 20 MILLIGRAM(S): at 06:48

## 2020-05-29 RX ADMIN — Medication 1 DROP(S): at 05:29

## 2020-05-29 RX ADMIN — ENOXAPARIN SODIUM 40 MILLIGRAM(S): 100 INJECTION SUBCUTANEOUS at 11:45

## 2020-05-29 RX ADMIN — LIDOCAINE 2 PATCH: 4 CREAM TOPICAL at 06:48

## 2020-05-29 RX ADMIN — Medication 1 TABLET(S): at 08:40

## 2020-05-29 RX ADMIN — Medication 500 MILLIGRAM(S): at 05:28

## 2020-05-29 RX ADMIN — ESCITALOPRAM OXALATE 20 MILLIGRAM(S): 10 TABLET, FILM COATED ORAL at 11:47

## 2020-05-29 RX ADMIN — GABAPENTIN 300 MILLIGRAM(S): 400 CAPSULE ORAL at 05:29

## 2020-05-29 RX ADMIN — Medication 10 MILLIEQUIVALENT(S): at 05:28

## 2020-05-29 RX ADMIN — GABAPENTIN 300 MILLIGRAM(S): 400 CAPSULE ORAL at 14:23

## 2020-05-29 RX ADMIN — Medication 81 MILLIGRAM(S): at 14:23

## 2020-05-29 RX ADMIN — SENNA PLUS 2 TABLET(S): 8.6 TABLET ORAL at 21:34

## 2020-05-29 RX ADMIN — ATORVASTATIN CALCIUM 40 MILLIGRAM(S): 80 TABLET, FILM COATED ORAL at 21:34

## 2020-05-29 RX ADMIN — TRAMADOL HYDROCHLORIDE 50 MILLIGRAM(S): 50 TABLET ORAL at 21:33

## 2020-05-29 RX ADMIN — Medication 1 DROP(S): at 17:11

## 2020-05-29 RX ADMIN — Medication 975 MILLIGRAM(S): at 05:28

## 2020-05-29 RX ADMIN — TRAMADOL HYDROCHLORIDE 50 MILLIGRAM(S): 50 TABLET ORAL at 08:45

## 2020-05-29 RX ADMIN — Medication 1000 UNIT(S): at 11:46

## 2020-05-29 RX ADMIN — PANTOPRAZOLE SODIUM 40 MILLIGRAM(S): 20 TABLET, DELAYED RELEASE ORAL at 06:48

## 2020-05-29 RX ADMIN — LATANOPROST 1 DROP(S): 0.05 SOLUTION/ DROPS OPHTHALMIC; TOPICAL at 21:33

## 2020-05-29 RX ADMIN — Medication 500 MILLIGRAM(S): at 17:11

## 2020-05-29 RX ADMIN — Medication 6 MILLIGRAM(S): at 21:34

## 2020-05-29 RX ADMIN — LIDOCAINE 2 PATCH: 4 CREAM TOPICAL at 19:53

## 2020-05-29 RX ADMIN — LIDOCAINE 2 PATCH: 4 CREAM TOPICAL at 10:51

## 2020-05-29 RX ADMIN — Medication 20 MILLIGRAM(S): at 17:11

## 2020-05-29 RX ADMIN — Medication 10 MILLIEQUIVALENT(S): at 17:11

## 2020-05-29 RX ADMIN — Medication 20 MILLIGRAM(S): at 11:46

## 2020-05-29 RX ADMIN — GABAPENTIN 300 MILLIGRAM(S): 400 CAPSULE ORAL at 21:33

## 2020-05-29 RX ADMIN — Medication 1 TABLET(S): at 11:46

## 2020-05-29 NOTE — DISCHARGE NOTE NURSING/CASE MANAGEMENT/SOCIAL WORK - NSDCDMETYPESERV_GEN_ALL_CORE_FT
amberly, tub bench delivered to home; cane to come to bedside tomorrow am prior to DC (significant other Chaitanya called in payment)

## 2020-05-29 NOTE — DISCHARGE NOTE NURSING/CASE MANAGEMENT/SOCIAL WORK - PATIENT PORTAL LINK FT
You can access the FollowMyHealth Patient Portal offered by [facility] by registering at the following website: [email or web address]. By joining [employer]’s FollowMyHealth portal, you will also be able to view your health information using other applications (apps) compatible with our system.

## 2020-05-29 NOTE — PROGRESS NOTE ADULT - SUBJECTIVE AND OBJECTIVE BOX
HPI:  This is a 79 y/o female who presented to the ED at [facility] after falling down a flight of stairs. Patient reports that she was getting up to use the restroom and while reaching for a light at the top of the stairs, lost her balance and fell down the stairs. In the ED, she had complaints of right chest wall pain, right elbow pain, and left shoulder pain. CTH was negative, CT of the spine showed corner fx of the anterior aspect of the base of C2. CT chest showed many chronic rib fractures and a T 9 compression fx (indeterminate age). CT of the abdomen showed subcutaneous edema/hemorrhage of the right flank. Xray of the left shoulder showed a comminuted fx of the proximal humerus. Othropedics were consulted and determined no surgical intervention was needed for the left humerus fx was needed. Recommended that patient is to remain NWB to left arm at all times. Neurosurgery was also consulted and determined that no surgical intervention was needed for C2 fx. Recommended that a [place] J collar be worn when OOB only.   Swallow eval done on May 15. Found patient to have moderate dysphagia, recommended nectar thick liquids with mechanical soft diet. Recommend small sips/bites.     Patient deemed medically stable for transfer to acute IPR at [facility] on 5/19/2020. (19 May 2020 14:57)    ROS- slept well pain controlled + BM+void    Vital Signs Last 24 Hrs  T(C): 36.8 (29 May 2020 08:47), Max: 37 (28 May 2020 20:50)  T(F): 98.2 (29 May 2020 08:47), Max: 98.6 (28 May 2020 20:50)  HR: 81 (29 May 2020 08:47) (73 - 81)  BP: 116/71 (29 May 2020 08:47) (116/71 - 137/74)  BP(mean): --  RR: 16 (29 May 2020 08:47) (15 - 16)  SpO2: 95% (29 May 2020 08:47) (93% - 95%)    Physical Exam  GENERAL Exam:     Nontoxic , No Acute Distress  with [place] J on  	HEENT:  normocephalic, atraumatic	  	LUNGS:	Clear bilaterally    	HEART:	 Normal S1S2   No murmur RRR        	GI/ ABDOMEN:  Soft,  +BS, tender to palpation  	EXTREMITIES:   edema LUE less +ecchymoses  left upper extremity in sling  	MUSCULOSKELETAL: as above    	SKIN:      + Ecchymosis right elbow, L ant tibial area with scab  RUE/BLES with FROM MS 5/5  LUE with Good ROM elbow>distally with MS 5/5 L shoulder not Tested  Sensation Intact to lt Touch throughout    IDT meeting 5/26/2020:     OT: min A lower body ADLs   mod A toileting and toilet transfers, tub bench transfers   goals: mod I     PT: min A transfers and bed mobility   CG to CS 75Ft ambulation   4 steps with 1 rail CG to min A (difficulty seeing steps 2/2 neck brace)   barriers: pain (although improving), stairs, brace    Tentative d/c plan: d/c home with significant other and home services on Saturday 5/30  Plan for family training this week.               labs                                     11.0   6.80  )-----------( 209      ( 26 May 2020 05:43 )             34.0   05-26    139  |  102  |  10  ----------------------------<  97  3.8   |  29  |  0.52    Ca    8.4      26 May 2020 05:43    TPro  6.0  /  Alb  2.7<L>  /  TBili  0.6  /  DBili  x   /  AST  23  /  ALT  18  /  AlkPhos  150<H>  05-26

## 2020-05-29 NOTE — DISCHARGE NOTE PROVIDER - NSDCFUADDAPPT_GEN_ALL_CORE_FT
Follow up with Dr Dudley Maier in 1-2 weeks, [phone]  Follow up with Dr Mai Campos in 1-2 weeks, [phone]  Follow up with Dr Anibal Garcia in 2-4 weeks, [phone]

## 2020-05-29 NOTE — DISCHARGE NOTE NURSING/CASE MANAGEMENT/SOCIAL WORK - NSDCFUADDAPPT_GEN_ALL_CORE_FT
Follow up with Dr Dudley Maier in 1-2 weeks, [phone]  Follow up with Dr Mai Campos in 1-2 weeks, [phone]  Follow up with Dr Anibal Garcia in 2-4 weeks, [phone]

## 2020-05-29 NOTE — PROGRESS NOTE ADULT - ASSESSMENT
79 y/o woman    Background medical history:     ·	CAD s/p stent   ·	HLD  ·	Thyroid CA s/p thyroidectomy  ·	Migraines     In the acute phase of rehab care for:     Fell down a flight of stairs     Presumed, mechanical fall     C2 anterior wedge fracture  T9 vertebral fx (age indeterminate), and left humeral fx  D, in a sling     Dysphagia, resolved   Reg consistency with Thins    Anemia stable no gib   monitor     Thrombocytopenia   No bleed     CNS: Migraines in remission   -C/w Fioricet qAM    DVT Prophylaxis: Lovenox    reviewed w patient and rehab team plan of care 79 y/o woman    Background medical history:     ·	CAD s/p stent   ·	HLD  ·	Thyroid CA s/p thyroidectomy  ·	Migraines     In the acute phase of rehab care for:     Fell down a flight of stairs     Presumed, mechanical fall     C2 anterior wedge fracture  T9 vertebral fx (age indeterminate), and left humeral fx  D, in a sling     Dysphagia, resolved   Reg consistency with Thins    Anemia stable no gib resolving   monitor     Thrombocytopenia resolved  No bleed     CNS: Migraines in remission   -C/w Fioricet qAM    DVT Prophylaxis: Lovenox    reviewed w patient and rehab team plan of care 81 y/o woman    Background medical history:     ·	CAD s/p stent   ·	HLD  ·	Thyroid CA s/p thyroidectomy  ·	Migraines     In the acute phase of rehab care for:     Fell down a flight of stairs     Presumed, mechanical fall     C2 anterior wedge fracture  T9 vertebral fx (age indeterminate)  Left humeral fx in a sling   Abdominal wall hematoma     Issues     Unclear why she is on rifaximin ?  If no clear indication, stop       check cbc, cmp, k   may not need supplemental potassium     Dysphagia, resolved   Reg consistency with Thins    Anemia stable no gib resolving   monitor     Thrombocytopenia resolved  No bleed     HDL statin     CNS: Migraines in remission   -C/w Fioricet qAM    DVT Prophylaxis: Lovenox    reviewed w patient and rehab team plan of care 79 y/o woman    Background medical history:     ·	CAD s/p stent   ·	HLD  ·	Thyroid CA s/p thyroidectomy  ·	Migraines     In the acute phase of rehab care for:     Fell down a flight of stairs     Presumed, mechanical fall     C2 anterior wedge fracture  T9 vertebral fx (age indeterminate)  Left humeral fx in a sling   Abdominal wall hematoma     Issues     Unclear why she is on rifaximin ?  If no clear indication, stop       check cbc, cmp, k   may not need supplemental potassium     Dysphagia, resolved   Reg consistency with Thins    Anemia stable no gib resolving   monitor     Thrombocytopenia resolved  No bleed     HDL statin     Hypothyroid sp sx for ca   continue home synthroid     Glaucoma asymptomatic   continue home eye drops     CNS: Migraines in remission   -C/w Fioricet qAM    DVT Prophylaxis: Lovenox    reviewed w patient and rehab team plan of care

## 2020-05-29 NOTE — PROGRESS NOTE ADULT - SUBJECTIVE AND OBJECTIVE BOX
In wheelchair     "I'm doing ok"    no pain     ate breakfast   no issues     ROS all others are neg     Vital Signs Last 24 Hrs    T(C): 36.8 (29 May 2020 08:47), Max: 37 (28 May 2020 20:50)  T(F): 98.2 (29 May 2020 08:47), Max: 98.6 (28 May 2020 20:50)  HR: 81 (29 May 2020 08:47) (73 - 81)  BP: 116/71 (29 May 2020 08:47) (116/71 - 137/74)  BP(mean): --  RR: 16 (29 May 2020 08:47) (15 - 16)  SpO2: 95% (29 May 2020 08:47) (93% - 95%)     MEDICATIONS  (STANDING):    acetaminophen   Tablet .. 975 milliGRAM(s) Oral <User Schedule>  acetaminophen 325 mG/butalbital 50 mG/caffeine 40 mG 1 Tablet(s) Oral <User Schedule>  ascorbic acid 500 milliGRAM(s) Oral two times a day  aspirin enteric coated 81 milliGRAM(s) Oral daily  atorvastatin 40 milliGRAM(s) Oral at bedtime  cholecalciferol 1000 Unit(s) Oral daily  dicyclomine 20 milliGRAM(s) Oral three times a day before meals  enoxaparin Injectable 40 milliGRAM(s) SubCutaneous daily  escitalopram 20 milliGRAM(s) Oral daily  gabapentin 300 milliGRAM(s) Oral three times a day  latanoprost 0.005% Ophthalmic Solution 1 Drop(s) Both EYES at bedtime  levothyroxine 100 MICROGram(s) Oral daily  lidocaine   Patch 2 Patch Transdermal <User Schedule>  melatonin 6 milliGRAM(s) Oral <User Schedule>  multivitamin 1 Tablet(s) Oral daily  pantoprazole    Tablet 40 milliGRAM(s) Oral before breakfast  potassium chloride    Tablet ER 10 milliEquivalent(s) Oral two times a day  rifAXIMin 550 milliGRAM(s) Oral <User Schedule>  timolol 0.25% Solution 1 Drop(s) Both EYES two times a day    MEDICATIONS  (PRN):  acetaminophen   Tablet .. 650 milliGRAM(s) Oral every 6 hours PRN Mild Pain (1 - 3)  loperamide 2 milliGRAM(s) Oral every 6 hours PRN abdominal cramping from IBS  ondansetron   Disintegrating Tablet 4 milliGRAM(s) Oral every 6 hours PRN Nausea and/or Vomiting  senna 2 Tablet(s) Oral at bedtime PRN Constipation  traMADol 50 milliGRAM(s) Oral every 4 hours PRN Moderate Pain (4 - 6)

## 2020-05-29 NOTE — DISCHARGE NOTE PROVIDER - NSDCMRMEDTOKEN_GEN_ALL_CORE_FT
acetaminophen 325 mg oral tablet: 2 tab(s) orally every 6 hours, As needed, Mild Pain (1 - 3)  ascorbic acid 500 mg oral tablet: 1 tab(s) orally 2 times a day  aspirin 81 mg oral delayed release tablet: 1 tab(s) orally once a day  atorvastatin 40 mg oral tablet: 1 tab(s) orally once a day (at bedtime)  butalbital/acetaminophen/caffeine 50 mg-325 mg-40 mg oral tablet: 1 tab(s) orally once a day   cholecalciferol oral tablet: 1000 unit(s) orally once a day  dicyclomine 10 mg oral capsule: 2 cap(s) orally 3 times a day (before meals)   escitalopram 20 mg oral tablet: 1 tab(s) orally once a day  gabapentin 300 mg oral capsule: 1 cap(s) orally 3 times a day  latanoprost 0.005% ophthalmic solution: 1 drop(s) to each affected eye once a day (at bedtime)  levothyroxine 100 mcg (0.1 mg) oral tablet: 1 tab(s) orally once a day  loperamide 2 mg oral capsule: 1 cap(s) orally every 6 hours, As needed, abdominal cramping from IBS  melatonin 3 mg oral tablet: 2 tab(s) orally once a day (at bedtime)   Multiple Vitamins oral tablet: 1 tab(s) orally once a day  ondansetron 4 mg oral tablet, disintegratin tab(s) orally every 6 hours, As needed, Nausea and/or Vomiting  pantoprazole 40 mg oral delayed release tablet: 1 tab(s) orally once a day (before a meal)  potassium chloride 10 mEq oral tablet, extended release: 1 tab(s) orally 2 times a day  rifAXIMin 550 mg oral tablet: 1 tab(s) orally once a day   timolol maleate 0.25% ophthalmic solution: 1 drop(s) to each affected eye 2 times a day  traMADol 50 mg oral tablet: 1 tab(s) orally every 4 hours, As needed, Moderate Pain (4 - 6) MDD:6

## 2020-05-29 NOTE — DISCHARGE NOTE PROVIDER - CARE PROVIDER_API CALL
Hudson Wynne  PHYSICAL/REHAB MEDICINE  [facility] [place] [address]  Phone: [phone]  Fax: [phone]  Follow Up Time:

## 2020-05-29 NOTE — PROGRESS NOTE ADULT - ASSESSMENT
This is a 81 y/o female with PMHx of CAD s/p stenting, HLD, thyroid CA s/p thyroidectomy, migraines who had a fall down a flight of stairs resulting in a C2 anterior wedge fracture, T9 vertebral fx (age indeterminate), and left humeral fx who now has functional deficits secondary to these injuries consisting of gait and ADL impairments as well as dysphagia.     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

## 2020-05-29 NOTE — DISCHARGE NOTE PROVIDER - HOSPITAL COURSE
This is a 79 y/o female who presented to the ED at [facility] after falling down a flight of stairs. Patient reports that she was getting up to use the restroom and while reaching for a light at the top of the stairs, lost her balance and fell down the stairs. In the ED, she had complaints of right chest wall pain, right elbow pain, and left shoulder pain. CTH was negative, CT of the spine showed corner fx of the anterior aspect of the base of C2. CT chest showed many chronic rib fractures and a T 9 compression fx (indeterminate age). CT of the abdomen showed subcutaneous edema/hemorrhage of the right flank. Xray of the left shoulder showed a comminuted fx of the proximal humerus. Othropedics were consulted and determined no surgical intervention was needed for the left humerus fx was needed. Recommended that patient is to remain NWB to left arm at all times. Neurosurgery was also consulted and determined that no surgical intervention was needed for C2 fx. Recommended that a [place] J collar be worn when OOB only.     Swallow eval done on May 15. Found patient to have moderate dysphagia, recommended nectar thick liquids with mechanical soft diet. Recommend small sips/bites.         Patient deemed medically stable for transfer to acute IPR at [facility] on 5/19/2020.             During hospitalization at [facility] for acute IPR, patient made functional gains and remained medically stable.     Patient did described hallucinations overnight the first couple of nights after admission. She attributed these hallucinations to the oxycodone that had been prescribed for pain. Oxycodone stopped and hallucinations resolved.     Patient's pain was controlled on tylenol and tramadol prn.         Patient is medically stable for d/c home with home services.

## 2020-05-29 NOTE — DISCHARGE NOTE PROVIDER - NSDCCPCAREPLAN_GEN_ALL_CORE_FT
PRINCIPAL DISCHARGE DIAGNOSIS  Diagnosis: Cervical spine fracture  Assessment and Plan of Treatment: s/p fall with C2 anterior wedge fracture   Continue to wear [place] J collar while out of bed   May use philadephia collar (tan/pink plastic collar) when showering.   Follow up with orthopeditst in 1-2 weeks.      SECONDARY DISCHARGE DIAGNOSES  Diagnosis: Depression  Assessment and Plan of Treatment: continue lexapro    Diagnosis: IBS (irritable bowel syndrome)  Assessment and Plan of Treatment: continue medications as prescribed    Diagnosis: Left humeral fracture  Assessment and Plan of Treatment: Do not do range the shoulder (active or passive)   Keep arm in sling except when slipping shirt onto arm   Follow up [facility] ortho in 1-2 weeks

## 2020-05-30 VITALS — SYSTOLIC BLOOD PRESSURE: 154 MMHG | DIASTOLIC BLOOD PRESSURE: 72 MMHG | HEART RATE: 73 BPM

## 2020-05-30 PROCEDURE — 99232 SBSQ HOSP IP/OBS MODERATE 35: CPT

## 2020-05-30 PROCEDURE — 99233 SBSQ HOSP IP/OBS HIGH 50: CPT

## 2020-05-30 RX ADMIN — TRAMADOL HYDROCHLORIDE 50 MILLIGRAM(S): 50 TABLET ORAL at 09:25

## 2020-05-30 RX ADMIN — Medication 20 MILLIGRAM(S): at 12:15

## 2020-05-30 RX ADMIN — PANTOPRAZOLE SODIUM 40 MILLIGRAM(S): 20 TABLET, DELAYED RELEASE ORAL at 05:25

## 2020-05-30 RX ADMIN — ENOXAPARIN SODIUM 40 MILLIGRAM(S): 100 INJECTION SUBCUTANEOUS at 12:15

## 2020-05-30 RX ADMIN — Medication 500 MILLIGRAM(S): at 05:25

## 2020-05-30 RX ADMIN — GABAPENTIN 300 MILLIGRAM(S): 400 CAPSULE ORAL at 05:25

## 2020-05-30 RX ADMIN — Medication 100 MICROGRAM(S): at 05:25

## 2020-05-30 RX ADMIN — LIDOCAINE 2 PATCH: 4 CREAM TOPICAL at 09:25

## 2020-05-30 RX ADMIN — Medication 20 MILLIGRAM(S): at 06:47

## 2020-05-30 RX ADMIN — Medication 1 TABLET(S): at 09:25

## 2020-05-30 RX ADMIN — LIDOCAINE 2 PATCH: 4 CREAM TOPICAL at 06:47

## 2020-05-30 RX ADMIN — ESCITALOPRAM OXALATE 20 MILLIGRAM(S): 10 TABLET, FILM COATED ORAL at 12:15

## 2020-05-30 RX ADMIN — Medication 81 MILLIGRAM(S): at 12:15

## 2020-05-30 RX ADMIN — Medication 975 MILLIGRAM(S): at 05:26

## 2020-05-30 RX ADMIN — Medication 1000 UNIT(S): at 12:15

## 2020-05-30 RX ADMIN — Medication 1 DROP(S): at 05:26

## 2020-05-30 RX ADMIN — Medication 1 TABLET(S): at 12:15

## 2020-05-30 RX ADMIN — Medication 10 MILLIEQUIVALENT(S): at 05:25

## 2020-05-30 NOTE — PROGRESS NOTE ADULT - REASON FOR ADMISSION
muti trauma with c2 anterior wedge fx, t9 fx and left humeral fx
mutitrauma with c2 anterior wedge fx, t9 fx and left humeral fx
multitrauma with c2 anterior wedge fx, t9 fx and left humeral fx
mutitrauma with c2 anterior wedge fx, t9 fx and left humeral fx
mutitrauma with c2 anterior wedge fx, t9 fx and left humeral fx fu
trauma with c2 anterior wedge fx, t9 fx and left humeral fx

## 2020-05-30 NOTE — PROGRESS NOTE ADULT - ADDITIONAL PE
clinical data, labs and image reviewed, viewed
clinical data, labs med list reviewed
clinical data, labs, med list reviewed
clinical data, labs and med list reviewed

## 2020-05-30 NOTE — PROGRESS NOTE ADULT - SUBJECTIVE AND OBJECTIVE BOX
No overnight events.  has multiple questions about discharge, renewing pain meds, going to doctor's appts.     REVIEW OF SYSTEMS  Constitutional - No fever,  No fatigue  Neurological - No headaches, No loss of strength  Musculoskeletal - No joint pain, No joint swelling, No muscle pain    VITALS  T(C): 37.1 (05-29-20 @ 20:57), Max: 37.1 (05-29-20 @ 20:57)  HR: 73 (05-30-20 @ 05:23) (73 - 86)  BP: 154/72 (05-30-20 @ 05:23) (154/72 - 155/79)  RR: 17 (05-29-20 @ 20:57) (17 - 17)  SpO2: 94% (05-29-20 @ 20:57) (94% - 94%)  Wt(kg): --       MEDICATIONS   acetaminophen   Tablet .. 650 milliGRAM(s) every 6 hours PRN  acetaminophen   Tablet .. 975 milliGRAM(s) <User Schedule>  acetaminophen 325 mG/butalbital 50 mG/caffeine 40 mG 1 Tablet(s) <User Schedule>  ascorbic acid 500 milliGRAM(s) two times a day  aspirin enteric coated 81 milliGRAM(s) daily  atorvastatin 40 milliGRAM(s) at bedtime  cholecalciferol 1000 Unit(s) daily  dicyclomine 20 milliGRAM(s) three times a day before meals  enoxaparin Injectable 40 milliGRAM(s) daily  escitalopram 20 milliGRAM(s) daily  gabapentin 300 milliGRAM(s) three times a day  latanoprost 0.005% Ophthalmic Solution 1 Drop(s) at bedtime  levothyroxine 100 MICROGram(s) daily  lidocaine   Patch 2 Patch <User Schedule>  loperamide 2 milliGRAM(s) every 6 hours PRN  melatonin 6 milliGRAM(s) <User Schedule>  multivitamin 1 Tablet(s) daily  ondansetron   Disintegrating Tablet 4 milliGRAM(s) every 6 hours PRN  pantoprazole    Tablet 40 milliGRAM(s) before breakfast  potassium chloride    Tablet ER 10 milliEquivalent(s) two times a day  rifAXIMin 550 milliGRAM(s) <User Schedule>  senna 2 Tablet(s) at bedtime PRN  timolol 0.25% Solution 1 Drop(s) two times a day  traMADol 50 milliGRAM(s) every 4 hours PRN      RECENT LABS/IMAGING                        11.1   5.99  )-----------( 203      ( 29 May 2020 11:45 )             34.7     05-29    141  |  104  |  11  ----------------------------<  104<H>  3.8   |  30  |  0.62    Ca    8.4      29 May 2020 11:45    TPro  6.3  /  Alb  2.9<L>  /  TBili  0.5  /  DBili  0.2  /  AST  19  /  ALT  18  /  AlkPhos  187<H>  05-29        ---------  PHYSICAL EXAM  Constitutional - NAD, Comfortable, +cervical collar   Pulm - Breathing comfortably, No wheezing  Abd - Soft, NTND  Extremities - LUE in sling  Neurologic Exam -                    Cognitive - Awake, Alert     Communication - Fluent     Motor - stable      Sensory - Intact to LT  Psychiatric - Mood WNL, Affect WNL    ASSESSMENT/PLAN  80y Female with functional deficits after fall down a flight of stairs resulting in a C2 anterior wedge fracture, T9 vertebral fx (age indeterminate), and left humeral fx who now has functional deficits secondary to these injuries consisting of gait and ADL impairments as well as dysphagia.   Cindy PINEDA  Continue current medical management  Pain - Tylenol, tramadol PRN, gabapentin  diet reg with thin  DVT PPX -

## 2020-05-30 NOTE — PROGRESS NOTE ADULT - ASSESSMENT
79 y/o woman    Background medical history:     ·	CAD s/p stent   ·	HLD  ·	Thyroid CA s/p thyroidectomy  ·	Migraines     In the acute phase of rehab care for:     Fell down a flight of stairs     Presumed, mechanical fall     C2 anterior wedge fracture  T9 vertebral fx (age indeterminate)  Left humeral fx in a sling   Abdominal wall hematoma     rehab team plans to discharge patient, fu w pcp, neurospine sx, in 1 week     Dysphagia, resolved   Reg consistency with Thins    Anemia stable no gib resolving   monitor     Thrombocytopenia resolved  No bleed     HDL statin     Hypothyroid sp sx for ca   continue home synthroid     Glaucoma asymptomatic   continue home eye drops     CNS: Migraines in remission   -C/w Fioricet qAM    DVT Prophylaxis: Lovenox    reviewed w patient and rehab team plan of care

## 2020-05-30 NOTE — PROGRESS NOTE ADULT - SUBJECTIVE AND OBJECTIVE BOX
generalized mild aches and pains      sitting up in her chair   in an c collar l arm sling       ROS all others are neg     no chest pain   no sob   no fc     Vital Signs Last 24 Hrs  T(C): 37.1 (29 May 2020 20:57), Max: 37.1 (29 May 2020 20:57)  T(F): 98.7 (29 May 2020 20:57), Max: 98.7 (29 May 2020 20:57)  HR: 73 (30 May 2020 05:23) (73 - 86)  BP: 154/72 (30 May 2020 05:23) (154/72 - 155/79)  BP(mean): --  RR: 17 (29 May 2020 20:57) (17 - 17)  SpO2: 94% (29 May 2020 20:57) (94% - 94%)    CBC Full  -  ( 29 May 2020 11:45 )  WBC Count : 5.99 K/uL  RBC Count : 3.55 M/uL  Hemoglobin : 11.1 g/dL  Hematocrit : 34.7 %  Platelet Count - Automated : 203 K/uL  Mean Cell Volume : 97.7 fl  Mean Cell Hemoglobin : 31.3 pg  Mean Cell Hemoglobin Concentration : 32.0 gm/dL  Auto Neutrophil # : x  Auto Lymphocyte # : x  Auto Monocyte # : x  Auto Eosinophil # : x  Auto Basophil # : x  Auto Neutrophil % : x  Auto Lymphocyte % : x  Auto Monocyte % : x  Auto Eosinophil % : x  Auto Basophil % : x    05-29    141  |  104  |  11  ----------------------------<  104<H>  3.8   |  30  |  0.62    Ca    8.4      29 May 2020 11:45    TPro  6.3  /  Alb  2.9<L>  /  TBili  0.5  /  DBili  0.2  /  AST  19  /  ALT  18  /  AlkPhos  187<H>  05-29      MEDICATIONS  (STANDING):  acetaminophen   Tablet .. 975 milliGRAM(s) Oral <User Schedule>  acetaminophen 325 mG/butalbital 50 mG/caffeine 40 mG 1 Tablet(s) Oral <User Schedule>  ascorbic acid 500 milliGRAM(s) Oral two times a day  aspirin enteric coated 81 milliGRAM(s) Oral daily  atorvastatin 40 milliGRAM(s) Oral at bedtime  cholecalciferol 1000 Unit(s) Oral daily  dicyclomine 20 milliGRAM(s) Oral three times a day before meals  enoxaparin Injectable 40 milliGRAM(s) SubCutaneous daily  escitalopram 20 milliGRAM(s) Oral daily  gabapentin 300 milliGRAM(s) Oral three times a day  latanoprost 0.005% Ophthalmic Solution 1 Drop(s) Both EYES at bedtime  levothyroxine 100 MICROGram(s) Oral daily  lidocaine   Patch 2 Patch Transdermal <User Schedule>  melatonin 6 milliGRAM(s) Oral <User Schedule>  multivitamin 1 Tablet(s) Oral daily  pantoprazole    Tablet 40 milliGRAM(s) Oral before breakfast  potassium chloride    Tablet ER 10 milliEquivalent(s) Oral two times a day  rifAXIMin 550 milliGRAM(s) Oral <User Schedule>  timolol 0.25% Solution 1 Drop(s) Both EYES two times a day    MEDICATIONS  (PRN):  acetaminophen   Tablet .. 650 milliGRAM(s) Oral every 6 hours PRN Mild Pain (1 - 3)  loperamide 2 milliGRAM(s) Oral every 6 hours PRN abdominal cramping from IBS  ondansetron   Disintegrating Tablet 4 milliGRAM(s) Oral every 6 hours PRN Nausea and/or Vomiting  senna 2 Tablet(s) Oral at bedtime PRN Constipation  traMADol 50 milliGRAM(s) Oral every 4 hours PRN Moderate Pain (4 - 6)

## 2020-06-06 PROCEDURE — 97112 NEUROMUSCULAR REEDUCATION: CPT

## 2020-06-06 PROCEDURE — 97110 THERAPEUTIC EXERCISES: CPT

## 2020-06-06 PROCEDURE — 97163 PT EVAL HIGH COMPLEX 45 MIN: CPT

## 2020-06-06 PROCEDURE — 80076 HEPATIC FUNCTION PANEL: CPT

## 2020-06-06 PROCEDURE — 71045 X-RAY EXAM CHEST 1 VIEW: CPT

## 2020-06-06 PROCEDURE — 82652 VIT D 1 25-DIHYDROXY: CPT

## 2020-06-06 PROCEDURE — 83735 ASSAY OF MAGNESIUM: CPT

## 2020-06-06 PROCEDURE — 93005 ELECTROCARDIOGRAM TRACING: CPT

## 2020-06-06 PROCEDURE — 80048 BASIC METABOLIC PNL TOTAL CA: CPT

## 2020-06-06 PROCEDURE — 80053 COMPREHEN METABOLIC PANEL: CPT

## 2020-06-06 PROCEDURE — 97116 GAIT TRAINING THERAPY: CPT

## 2020-06-06 PROCEDURE — 92610 EVALUATE SWALLOWING FUNCTION: CPT

## 2020-06-06 PROCEDURE — 97535 SELF CARE MNGMENT TRAINING: CPT

## 2020-06-06 PROCEDURE — 97530 THERAPEUTIC ACTIVITIES: CPT

## 2020-06-06 PROCEDURE — U0003: CPT

## 2020-06-06 PROCEDURE — 84100 ASSAY OF PHOSPHORUS: CPT

## 2020-06-06 PROCEDURE — 85027 COMPLETE CBC AUTOMATED: CPT

## 2020-06-06 PROCEDURE — 97167 OT EVAL HIGH COMPLEX 60 MIN: CPT

## 2022-10-03 NOTE — CHART NOTE - NSCHARTNOTEFT_GEN_A_CORE
Impression: Chronic angle-closure glaucoma, right eye, severe stage: C26.7729. LPI OU Hx of ACG OD
CCT average OU
IOP/ONH  stable OU Plan: Discussed diagnosis in detail with patient. Current therapy is best for patient. Continue using current medication(s). Brimonidine TID OD, Timolol BID OD and Latanoprost QHS OD. No glaucoma drops needed OS. Continue Pred Acetate TID OS only. VF ordered and reviewed w/pt today. F/u 6mths DE/OCT. Nutrition Follow Up Note  Hospital Course   (Per Electronic Medical Record):     Source:  Patient [X]  Medical Record [X]      Diet:   DASH-TLC Diet w/ Thin Liquids  Tolerates Diet Well  No Chewing/Swallowing Difficulties  No Recent Nausea, Vomiting, Diarrhea or Constipation  Varied Intake @Meals (as Per Documentation) - Intake Improving (Per Patient)   Recommend Change Low Sodium Diet - Order Pending Verification   Declines Nutrition Supplementation   Education Provided on Proper Nutrition     Enteral/Parenteral Nutrition: Not Applicable    Current Weight: 150.5lb on 5/19  Obtain New Weight  Obtain Weights Weekly     Pertinent Medications: MEDICATIONS  (STANDING):  acetaminophen 325 mG/butalbital 50 mG/caffeine 40 mG 1 Tablet(s) Oral <User Schedule>  ascorbic acid 500 milliGRAM(s) Oral two times a day  aspirin enteric coated 81 milliGRAM(s) Oral daily  atorvastatin 40 milliGRAM(s) Oral at bedtime  cholecalciferol 1000 Unit(s) Oral daily  dicyclomine 20 milliGRAM(s) Oral three times a day before meals  enoxaparin Injectable 40 milliGRAM(s) SubCutaneous daily  escitalopram 20 milliGRAM(s) Oral daily  gabapentin 200 milliGRAM(s) Oral three times a day  latanoprost 0.005% Ophthalmic Solution 1 Drop(s) Both EYES at bedtime  levothyroxine 100 MICROGram(s) Oral daily  lidocaine   Patch 1 Patch Transdermal <User Schedule>  melatonin 6 milliGRAM(s) Oral at bedtime  multivitamin 1 Tablet(s) Oral daily  pantoprazole    Tablet 40 milliGRAM(s) Oral before breakfast  potassium chloride    Tablet ER 10 milliEquivalent(s) Oral two times a day  rifAXIMin 550 milliGRAM(s) Oral <User Schedule>  timolol 0.25% Solution 1 Drop(s) Both EYES two times a day    MEDICATIONS  (PRN):  acetaminophen   Tablet .. 650 milliGRAM(s) Oral every 6 hours PRN Mild Pain (1 - 3)  loperamide 2 milliGRAM(s) Oral every 6 hours PRN abdominal cramping from IBS  ondansetron   Disintegrating Tablet 4 milliGRAM(s) Oral every 6 hours PRN Nausea and/or Vomiting  traMADol 50 milliGRAM(s) Oral every 4 hours PRN Moderate Pain (4 - 6)    Pertinent Labs:  05-21 Na140 mmol/L Glu 119 mg/dL<H> K+ 4.2 mmol/L Cr  0.53 mg/dL BUN 14 mg/dL 05-20 Phos 3.4 mg/dL 05-20 Alb 2.8 g/dL<L>    Skin: No Pressure Ulcers     Edema: None Noted     Last Bowel Movement: on 5/20    Estimated Needs:   [X] No Change Since Previous Assessment    Previous Nutrition Diagnosis:   Moderate Malnutrition    Nutrition Diagnosis is [X] Ongoing - Declines Nutrition Supplementation & Recommend Change Diet to Low Sodium Diet     New Nutrition Diagnosis: [X] Not Applicable      Interventions:   1. Recommend Change Diet to Low Sodium Diet (Declines Nutrition Supplementation)  2. Education Provided on Proper Nutrition   3. Recommend Continue Nutrition Plan of Care     Monitoring & Evaluation:   [X] Weights   [X] PO Intake   [X] Skin Integrity   [X] Follow Up (Per Protocol)  [X] Tolerance to Diet Prescription   [X] Other: Labs     Registered Dietitian/Nutritionist Remains Available.  [place] RDN    Pager # [phone]  Phone# [phone]